# Patient Record
Sex: FEMALE | Race: WHITE | NOT HISPANIC OR LATINO | Employment: UNEMPLOYED | URBAN - METROPOLITAN AREA
[De-identification: names, ages, dates, MRNs, and addresses within clinical notes are randomized per-mention and may not be internally consistent; named-entity substitution may affect disease eponyms.]

---

## 2017-11-01 RX ORDER — ALBUTEROL SULFATE 90 UG/1
2 AEROSOL, METERED RESPIRATORY (INHALATION) EVERY 6 HOURS PRN
COMMUNITY
End: 2021-12-13 | Stop reason: SDUPTHER

## 2017-11-01 NOTE — PRE-PROCEDURE INSTRUCTIONS
Pre-Surgery Instructions:   Medication Instructions    albuterol (PROVENTIL HFA,VENTOLIN HFA) 90 mcg/act inhaler Instructed patient per Anesthesia Guidelines   Fexofenadine-Pseudoephedrine (ALLEGRA-D PO) Instructed patient per Anesthesia Guidelines  Spoke to patient via telephone  Medications reviewed and patient was instructed as per anesthesia guidelines  Patient instructed to avoid all NSAIDs, supplements, vitamins, and Aspirin 7 days prior to surgery  [facility] pre-op instructions reviewed  Pre-op bathing reviewed and patient to  chlorhexidine soap or use a dial antibacterial soap

## 2017-11-09 ENCOUNTER — ANESTHESIA EVENT (OUTPATIENT)
Dept: PERIOP | Facility: HOSPITAL | Age: 42
End: 2017-11-09
Payer: COMMERCIAL

## 2017-11-10 ENCOUNTER — ANESTHESIA (OUTPATIENT)
Dept: PERIOP | Facility: HOSPITAL | Age: 42
End: 2017-11-10
Payer: COMMERCIAL

## 2017-11-10 ENCOUNTER — APPOINTMENT (OUTPATIENT)
Dept: RADIOLOGY | Facility: HOSPITAL | Age: 42
End: 2017-11-10
Payer: COMMERCIAL

## 2017-11-10 ENCOUNTER — HOSPITAL ENCOUNTER (OUTPATIENT)
Facility: HOSPITAL | Age: 42
Setting detail: OUTPATIENT SURGERY
Discharge: HOME | End: 2017-11-10
Attending: PODIATRIST | Admitting: PODIATRIST
Payer: COMMERCIAL

## 2017-11-10 VITALS
WEIGHT: 150 LBS | BODY MASS INDEX: 24.99 KG/M2 | RESPIRATION RATE: 16 BRPM | TEMPERATURE: 97.9 F | SYSTOLIC BLOOD PRESSURE: 99 MMHG | OXYGEN SATURATION: 100 % | DIASTOLIC BLOOD PRESSURE: 62 MMHG | HEART RATE: 58 BPM | HEIGHT: 65 IN

## 2017-11-10 DIAGNOSIS — D21.21 BENIGN NEOPLASM OF CONNECTIVE AND OTHER SOFT TISSUE OF RIGHT LOWER LIMB, INCLUDING HIP: ICD-10-CM

## 2017-11-10 LAB
EXT PREGNANCY TEST URINE: NEGATIVE
HIV 1+2 AB+HIV1 P24 AG SERPL QL IA: NORMAL
HIV1 P24 AG SER QL: NORMAL

## 2017-11-10 PROCEDURE — 88307 TISSUE EXAM BY PATHOLOGIST: CPT | Performed by: PODIATRIST

## 2017-11-10 PROCEDURE — 73630 X-RAY EXAM OF FOOT: CPT

## 2017-11-10 PROCEDURE — 88331 PATH CONSLTJ SURG 1 BLK 1SPC: CPT | Performed by: PATHOLOGY

## 2017-11-10 PROCEDURE — 81025 URINE PREGNANCY TEST: CPT | Performed by: ANESTHESIOLOGY

## 2017-11-10 PROCEDURE — 86803 HEPATITIS C AB TEST: CPT | Performed by: PODIATRIST

## 2017-11-10 PROCEDURE — 88311 DECALCIFY TISSUE: CPT | Performed by: PODIATRIST

## 2017-11-10 PROCEDURE — 87340 HEPATITIS B SURFACE AG IA: CPT | Performed by: PODIATRIST

## 2017-11-10 PROCEDURE — 87806 HIV AG W/HIV1&2 ANTB W/OPTIC: CPT | Performed by: PODIATRIST

## 2017-11-10 RX ORDER — ONDANSETRON 2 MG/ML
INJECTION INTRAMUSCULAR; INTRAVENOUS AS NEEDED
Status: DISCONTINUED | OUTPATIENT
Start: 2017-11-10 | End: 2017-11-10 | Stop reason: SURG

## 2017-11-10 RX ORDER — HYDROCODONE BITARTRATE AND ACETAMINOPHEN 5; 325 MG/1; MG/1
1 TABLET ORAL EVERY 6 HOURS PRN
Qty: 15 TABLET | Refills: 0 | Status: SHIPPED | OUTPATIENT
Start: 2017-11-10 | End: 2017-11-20

## 2017-11-10 RX ORDER — SODIUM CHLORIDE 9 MG/ML
125 INJECTION, SOLUTION INTRAVENOUS CONTINUOUS
Status: DISCONTINUED | OUTPATIENT
Start: 2017-11-10 | End: 2017-11-10 | Stop reason: HOSPADM

## 2017-11-10 RX ORDER — GLYCOPYRROLATE 0.2 MG/ML
INJECTION INTRAMUSCULAR; INTRAVENOUS AS NEEDED
Status: DISCONTINUED | OUTPATIENT
Start: 2017-11-10 | End: 2017-11-10 | Stop reason: SURG

## 2017-11-10 RX ORDER — FENTANYL CITRATE/PF 50 MCG/ML
25 SYRINGE (ML) INJECTION
Status: DISCONTINUED | OUTPATIENT
Start: 2017-11-10 | End: 2017-11-10 | Stop reason: HOSPADM

## 2017-11-10 RX ORDER — PROPOFOL 10 MG/ML
INJECTION, EMULSION INTRAVENOUS AS NEEDED
Status: DISCONTINUED | OUTPATIENT
Start: 2017-11-10 | End: 2017-11-10 | Stop reason: SURG

## 2017-11-10 RX ORDER — MIDAZOLAM HYDROCHLORIDE 1 MG/ML
INJECTION INTRAMUSCULAR; INTRAVENOUS AS NEEDED
Status: DISCONTINUED | OUTPATIENT
Start: 2017-11-10 | End: 2017-11-10 | Stop reason: SURG

## 2017-11-10 RX ORDER — FENTANYL CITRATE 50 UG/ML
INJECTION, SOLUTION INTRAMUSCULAR; INTRAVENOUS AS NEEDED
Status: DISCONTINUED | OUTPATIENT
Start: 2017-11-10 | End: 2017-11-10 | Stop reason: SURG

## 2017-11-10 RX ORDER — ONDANSETRON 2 MG/ML
4 INJECTION INTRAMUSCULAR; INTRAVENOUS ONCE AS NEEDED
Status: DISCONTINUED | OUTPATIENT
Start: 2017-11-10 | End: 2017-11-10 | Stop reason: HOSPADM

## 2017-11-10 RX ADMIN — PROPOFOL 200 MG: 10 INJECTION, EMULSION INTRAVENOUS at 14:53

## 2017-11-10 RX ADMIN — FENTANYL CITRATE 50 MCG: 50 INJECTION INTRAMUSCULAR; INTRAVENOUS at 15:31

## 2017-11-10 RX ADMIN — SODIUM CHLORIDE 125 ML/HR: 0.9 INJECTION, SOLUTION INTRAVENOUS at 16:25

## 2017-11-10 RX ADMIN — MIDAZOLAM HYDROCHLORIDE 2 MG: 1 INJECTION, SOLUTION INTRAMUSCULAR; INTRAVENOUS at 14:45

## 2017-11-10 RX ADMIN — DEXAMETHASONE SODIUM PHOSPHATE 4 MG: 10 INJECTION INTRAMUSCULAR; INTRAVENOUS at 14:57

## 2017-11-10 RX ADMIN — GLYCOPYRROLATE 0.2 MG: 0.2 INJECTION, SOLUTION INTRAMUSCULAR; INTRAVENOUS at 15:07

## 2017-11-10 RX ADMIN — FENTANYL CITRATE 50 MCG: 50 INJECTION INTRAMUSCULAR; INTRAVENOUS at 14:53

## 2017-11-10 RX ADMIN — SODIUM CHLORIDE: 0.9 INJECTION, SOLUTION INTRAVENOUS at 15:01

## 2017-11-10 RX ADMIN — ONDANSETRON HYDROCHLORIDE 4 MG: 2 INJECTION, SOLUTION INTRAVENOUS at 14:57

## 2017-11-10 NOTE — DISCHARGE INSTRUCTIONS
[facility]  Dr El Hathaway  1)  Cold Pack: Apply a cold pack for 45 - Minutes intervals just above the surgical site for the initial 24-48 hours  Never place on the toes  If a cast has been applied  Apply the cold pack to the thigh or knee area  2)  Elevation and Rest: Keep the foot elevated at least as high as the hips for the first 24-48 hours  It would be beneficial for the foot to be elevated when you are sitting during the first 7-10 days  Elevating the foot above the heart level will help control post operative pain and swelling  3)  Medication: Take prescription as prescribed by your physician  If you have any difficulty or side effects with the medication, stop taking immediately and notify your physician at once  Medications given today:     Norco     3a  if applicable you may be given one of the following for prevention of blood clots  O none    Protection of Surgical Site/ Assistive Devices:  a) You will be given one of the following:  O crutches with surgical shoe, weight bearing as tolerated   4) DO NOT GET THE BADAGE WET UNTIL THE DOCTOR GIVES PERMISSION  Keep the bandage clean, dry and intact until your follow-up appointment  5) If you had a peripheral nerve block performed by anesthesia department the numbness and loss of motor function of the extremity can last anywhere from 12-24 hours and sometimes a little bit longer    General Information  1)  Bleeding: some bleeding through the bandage is normal  If bleeding persists, you may attempt to reinforce the existing bandage while following the above instruction  If bleeding persists, notify the physician  2)  Temperature: if your temperature rises [id number] 5 degree, call the office [phone]  3)  Problem: If you notice increasing swelling and/ or pain 2 to 3 days following surgery , please notify    If a splint has been applied and you experience pain to extent the medication prescribed for pain does not seem to be effective you should remove the ace wraps and remove the splint if applied and allow about one half hour for relief then reapply splint and ace wraps  Should pain persist then please notify your physician  4)  Redressing: call the office the day following surgery to schedule a redressing to be in 7 days  Pain: Within the first 24 hours following surgery, if your pain is not controlled sufficiently with pain medication, please check that your bandage is not too tight  You may loosen the badge without removing it  Wait 30 minutes, if your pain is not relieved     Please call the office [phone]

## 2017-11-10 NOTE — DISCHARGE SUMMARY
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, including hip [D21 21]    Discharge Diagnosis: same    Procedures Performed: OPEN EXPLORATION THIRD TOE POSSIBLE REMOVAL OF SOFT TISSUE LESION  FROZEN SECTION: right foot     Complications: none    Condition at Discharge: stable    Discharge instructions/Information to patient and family:   See after visit summary for information provided to patient and family  Provisions for Follow-Up Care/Important appointments:  See after visit summary for information related to follow-up care and any pertinent home health orders  Discharge Medications:  See after visit summary for reconciled discharge medications provided to patient and family

## 2017-11-10 NOTE — ANESTHESIA PREPROCEDURE EVALUATION
Review of Systems/Medical History  Patient summary reviewed  Chart reviewed      Cardiovascular  Exercise tolerance: good,     Pulmonary  Asthma: well controlled/ stable , ,        GI/Hepatic  Negative GI/hepatic ROS          Negative  ROS        Endo/Other  Negative endo/other ROS      GYN  Negative gynecology ROS          Hematology  Anemia ,     Musculoskeletal  Negative musculoskeletal ROS        Neurology  Negative neurology ROS      Psychology   Negative psychology ROS            Physical Exam    Airway    Mallampati score: II  TM Distance: <3 FB  Neck ROM: full     Dental       Cardiovascular  Rhythm: regular, Rate: normal,     Pulmonary  Pulmonary exam normal     Other Findings        Anesthesia Plan  ASA Score- 2       Anesthesia Type- general with ASA Monitors  Additional Monitors:   Airway Plan:           Induction- intravenous  Informed Consent- Anesthetic plan and risks discussed with patient

## 2017-11-10 NOTE — OP NOTE
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, including hip [D21 21]    Procedures:   Right 3rd digit exploration and soft tissue removal   Right 3rd digit middle phalanx resection     Specimen(s):  ID Type Source Tests Collected by Time Destination   1 : third right toe crystal anlaysis Tissue Toe, Right TISSUE EXAM Sander Michel DPM 11/10/2017 1512    2 : bone lesion from base of middle phalanax third toe Tissue Toe, Right TISSUE EXAM Sander Michel DPM 11/10/2017 1520        Estimated Blood Loss:   Minimal    Drains: none     Hemostasis:  Pneumatic ankle tourniquet at 250 mm of mercury for 27 minutes    Materials:  3 0 Vicryl and 4 0 Prolene     Anesthesia Type:   IV Sedation with Anesthesia    Operative Indications:  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, including hip [D21 21]    Operative Findings:  Frozen section results showed calcium pyrophosphate  Possible bone mass was noted to the middle phalanx base which was excised and sent permanent pathology  Will await final results  Complications:   None    Procedure and Technique:  Under mild sedation, the patient was brought into the operating room and placed on the operating room table in the supine position  A pneumatic ankle tourniquet was then placed around the patient's right ankle with ample webril padding  A time out was performed to confirm the correct patient, procedure and site with all parties in agreement   Following IV sedation, local anesthetic was obtained about the patient's right foot and injection was performed consisting of 2 ml of 1% Lidocaine and 0 5% Bupivacaine in a 1:1 mixture  The foot was then scrubbed, prepped and draped in the usual aseptic manner  An esmarch bandage was utilized to exsangunate the patients foot and the pneumatic ankle tourniquet was then inflated  The esmarch bandage was removed and the foot was placed on the operating room table  Attention was then directed to the lateral aspect of the right foot 3rd digit where an incision was made ~2cm long  Then incision was deepened via blunt and sharp dissection  There was soft tissue mass which appeared to be white with some white serous drainage which was excised and sent to frozen section  Frozen section results were notified from pathology department which was calcium pyrophosphate  Then the proximal interphalangeal joint was explored where a possible bone mass was noted to the base of the middle phalanx, this was excised with the use of osteotome and sent to permanent pathology  Surgical site was inspected for any further abnormality and none were noted  Then it was irrigated with copious amounts of sterile saline  The capsule was reapproximated and closed with 3 0 Vicryl  Skin was reapproximated and closed with 4 0 Prolene in horizontal mattress fashion  Surgical site was dressed with Xeroform 4 x 4 gauze Raul and Ace  Tourniquet was deflated at this time a normal hyperemic response was noted to the digits  Patient tolerated the procedure and anesthesia well and was transported to the PACU with vital signs stable      Patient Disposition:  PACU  and hemodynamically stable    SIGNATURE: Liset Hansen DPM  DATE: November 10, 2017  TIME: 3:44 PM

## 2017-11-10 NOTE — ANESTHESIA POSTPROCEDURE EVALUATION
Post-Op Assessment Note      CV Status:  Stable    Mental Status:  Alert and awake    Hydration Status:  Euvolemic    PONV Controlled:  Controlled    Airway Patency:  Patent    Post Op Vitals Reviewed: Yes          Staff: Anesthesiologist           BP 99/62 (11/10/17 1715)    Temp      Pulse 58 (11/10/17 1715)   Resp 16 (11/10/17 1715)    SpO2 100 % (11/10/17 1715)

## 2017-11-11 LAB — HBV SURFACE AG SER QL: NORMAL

## 2017-11-12 LAB — HCV AB SER QL: NORMAL

## 2019-12-03 ENCOUNTER — OFFICE VISIT (OUTPATIENT)
Dept: FAMILY MEDICINE CLINIC | Facility: CLINIC | Age: 44
End: 2019-12-03
Payer: COMMERCIAL

## 2019-12-03 VITALS
HEART RATE: 77 BPM | RESPIRATION RATE: 20 BRPM | BODY MASS INDEX: 25.78 KG/M2 | TEMPERATURE: 99 F | HEIGHT: 64 IN | DIASTOLIC BLOOD PRESSURE: 70 MMHG | WEIGHT: 151 LBS | OXYGEN SATURATION: 99 % | SYSTOLIC BLOOD PRESSURE: 108 MMHG

## 2019-12-03 DIAGNOSIS — M25.551 PAIN OF RIGHT HIP JOINT: ICD-10-CM

## 2019-12-03 DIAGNOSIS — E66.3 OVERWEIGHT (BMI 25.0-29.9): ICD-10-CM

## 2019-12-03 DIAGNOSIS — Z00.00 ANNUAL PHYSICAL EXAM: Primary | ICD-10-CM

## 2019-12-03 DIAGNOSIS — Z13.220 SCREENING FOR CHOLESTEROL LEVEL: ICD-10-CM

## 2019-12-03 PROCEDURE — 99386 PREV VISIT NEW AGE 40-64: CPT | Performed by: NURSE PRACTITIONER

## 2019-12-03 NOTE — PROGRESS NOTES
[address] [facility] Care        NAME: Bushra Lacey is a 38 y o  female  : 1975    MRN: [id number]  DATE: December 3, 2019  TIME: 8:15 AM    Assessment and Plan   Annual physical exam [Z00 00]  1  Annual physical exam  Comprehensive metabolic panel    Lipid panel    Vitamin D 25 hydroxy   2  Pain of right hip joint  Vitamin D 25 hydroxy    Lyme Antibody Profile with reflex to WB   3  Screening for cholesterol level  Lipid panel   4  Overweight (BMI 25 0-29  9)           Patient Instructions     Patient Instructions   Get lab work done  Call or make appointment if symptoms worsening or develop rash  Call if physical therapy referral is needed           Chief Complaint     Chief Complaint   Patient presents with    Establish Care         History of Present Illness       Presents to office to establish care previous seen by [facility] once  Uses albuterol inhaler as needed for asthma, does not need refill at this time  Takes Allegra-D  Complains of pain in right leg during the night that would wake her up for the pass month and a half, now has a consistent pain to right hip and lateral thigh area and right lateral calf  Describe pain as aching  Patient would like Vitamin D level check  Discussed checking Lyme titer   Discussed possibility of lumbar spine issuse causing pain to right leg        Review of Systems   Review of Systems   Constitutional: Positive for fatigue  Negative for activity change, diaphoresis and fever  HENT: Negative for congestion, ear pain, facial swelling, hearing loss, rhinorrhea, sinus pressure, sinus pain, sneezing, sore throat and voice change  Eyes: Negative for discharge and visual disturbance  Respiratory: Negative for cough, choking, chest tightness, shortness of breath, wheezing and stridor  Cardiovascular: Negative for chest pain, palpitations and leg swelling     Gastrointestinal: Negative for abdominal distention, abdominal pain, constipation, diarrhea, nausea and vomiting  Endocrine: Negative for polydipsia, polyphagia and polyuria  Genitourinary: Negative for difficulty urinating, dysuria, frequency and urgency  Musculoskeletal: Positive for joint swelling  Negative for arthralgias, back pain, gait problem (pain to right hip ), myalgias, neck pain and neck stiffness  Skin: Negative for color change, rash and wound  Neurological: Negative for dizziness, syncope, speech difficulty, weakness, light-headedness and headaches  Hematological: Negative for adenopathy  Does not bruise/bleed easily  Psychiatric/Behavioral: Negative for agitation, behavioral problems, confusion, hallucinations, sleep disturbance and suicidal ideas  The patient is not nervous/anxious  Current Medications       Current Outpatient Medications:     albuterol (PROVENTIL HFA,VENTOLIN HFA) 90 mcg/act inhaler, Inhale 2 puffs every 6 (six) hours as needed for wheezing, Disp: , Rfl:     Fexofenadine-Pseudoephedrine (ALLEGRA-D 24 HOUR PO), Take by mouth, Disp: , Rfl:     Current Allergies     Allergies as of 12/03/2019    (No Known Allergies)            The following portions of the patient's history were reviewed and updated as appropriate: allergies, current medications, past family history, past medical history, past social history, past surgical history and problem list      Past Medical History:   Diagnosis Date    Acute closed-angle glaucoma     Anemia     Asthma        Past Surgical History:   Procedure Laterality Date    EYE SURGERY      MASS EXCISION Right 11/10/2017    Procedure: OPEN EXPLORATION THIRD TOE POSSIBLE REMOVAL OF SOFT TISSUE [address] [facility] TOE;  Surgeon: Kimberly Coto DPM;  Location: [facility];  Service: Podiatry    WISDOM TOOTH EXTRACTION         Family History   Problem Relation Age of Onset    Cancer Father          Medications have been verified          Objective   /70 Pulse 77   Temp 99 °F (37 2 °C) (Tympanic)   Resp 20   Ht 5' 4" (1 626 m)   Wt 68 5 kg (151 lb)   SpO2 99%   BMI 25 92 kg/m²        Physical Exam     Physical Exam   Constitutional: She is oriented to person, place, and time  She appears well-developed and well-nourished  No distress  HENT:   Head: Normocephalic  Right Ear: External ear normal    Left Ear: External ear normal    Eyes: Conjunctivae and EOM are normal  Right eye exhibits no discharge  Left eye exhibits no discharge  Neck: Normal range of motion  Neck supple  No tracheal deviation present  No thyromegaly present  Cardiovascular: Normal rate, regular rhythm and normal heart sounds  No murmur heard  Pulmonary/Chest: Effort normal and breath sounds normal  No respiratory distress  She has no wheezes  Musculoskeletal: Normal range of motion  She exhibits no edema or deformity  Neurological: She is alert and oriented to person, place, and time  She displays normal reflexes  Skin: Skin is warm and dry  No rash noted  She is not diaphoretic  No erythema  No pallor  Psychiatric: She has a normal mood and affect  Her behavior is normal  Judgment and thought content normal        BMI Counseling: Body mass index is 25 92 kg/m²  The BMI is above normal  Exercise recommendations include exercising 3-5 times per week        PHQ-9 Depression Screening    PHQ-9:    Frequency of the following problems over the past two weeks:       Little interest or pleasure in doing things:  0 - not at all  Feeling down, depressed, or hopeless:  1 - several days  PHQ-2 Score:  1

## 2019-12-03 NOTE — PATIENT INSTRUCTIONS
Get lab work done  Call or make appointment if symptoms worsening or develop rash  Call if physical therapy referral is needed

## 2019-12-17 ENCOUNTER — APPOINTMENT (OUTPATIENT)
Dept: LAB | Facility: HOSPITAL | Age: 44
End: 2019-12-17
Payer: COMMERCIAL

## 2019-12-17 DIAGNOSIS — M25.551 PAIN OF RIGHT HIP JOINT: ICD-10-CM

## 2019-12-17 DIAGNOSIS — Z00.00 ANNUAL PHYSICAL EXAM: ICD-10-CM

## 2019-12-17 DIAGNOSIS — Z13.220 SCREENING FOR CHOLESTEROL LEVEL: ICD-10-CM

## 2019-12-17 LAB
25(OH)D3 SERPL-MCNC: 27.2 NG/ML (ref 30–100)
ALBUMIN SERPL BCP-MCNC: 3.8 G/DL (ref 3.5–5)
ALP SERPL-CCNC: 40 U/L (ref 46–116)
ALT SERPL W P-5'-P-CCNC: 18 U/L (ref 12–78)
ANION GAP SERPL CALCULATED.3IONS-SCNC: 4 MMOL/L (ref 4–13)
AST SERPL W P-5'-P-CCNC: 17 U/L (ref 5–45)
BILIRUB SERPL-MCNC: 0.58 MG/DL (ref 0.2–1)
BUN SERPL-MCNC: 12 MG/DL (ref 5–25)
CALCIUM SERPL-MCNC: 9.1 MG/DL (ref 8.3–10.1)
CHLORIDE SERPL-SCNC: 106 MMOL/L (ref 100–108)
CHOLEST SERPL-MCNC: 162 MG/DL (ref 50–200)
CO2 SERPL-SCNC: 29 MMOL/L (ref 21–32)
CREAT SERPL-MCNC: 0.71 MG/DL (ref 0.6–1.3)
GFR SERPL CREATININE-BSD FRML MDRD: 103 ML/MIN/1.73SQ M
GLUCOSE P FAST SERPL-MCNC: 81 MG/DL (ref 65–99)
HDLC SERPL-MCNC: 62 MG/DL
LDLC SERPL CALC-MCNC: 92 MG/DL (ref 0–100)
NONHDLC SERPL-MCNC: 100 MG/DL
POTASSIUM SERPL-SCNC: 4 MMOL/L (ref 3.5–5.3)
PROT SERPL-MCNC: 7.4 G/DL (ref 6.4–8.2)
SODIUM SERPL-SCNC: 139 MMOL/L (ref 136–145)
TRIGL SERPL-MCNC: 38 MG/DL

## 2019-12-17 PROCEDURE — 80053 COMPREHEN METABOLIC PANEL: CPT

## 2019-12-17 PROCEDURE — 82306 VITAMIN D 25 HYDROXY: CPT

## 2019-12-17 PROCEDURE — 80061 LIPID PANEL: CPT

## 2019-12-17 PROCEDURE — 86618 LYME DISEASE ANTIBODY: CPT

## 2019-12-17 PROCEDURE — 36415 COLL VENOUS BLD VENIPUNCTURE: CPT

## 2019-12-18 LAB — B BURGDOR IGG+IGM SER-ACNC: <0.91 ISR (ref 0–0.9)

## 2020-06-05 DIAGNOSIS — B37.3 VAGINAL YEAST INFECTION: Primary | ICD-10-CM

## 2020-06-05 RX ORDER — FLUCONAZOLE 150 MG/1
150 TABLET ORAL DAILY
Qty: 3 TABLET | Refills: 0 | Status: SHIPPED | OUTPATIENT
Start: 2020-06-05 | End: 2020-06-08

## 2020-08-13 ENCOUNTER — OFFICE VISIT (OUTPATIENT)
Dept: FAMILY MEDICINE CLINIC | Facility: CLINIC | Age: 45
End: 2020-08-13
Payer: COMMERCIAL

## 2020-08-13 VITALS
RESPIRATION RATE: 18 BRPM | HEIGHT: 64 IN | WEIGHT: 148 LBS | BODY MASS INDEX: 25.27 KG/M2 | TEMPERATURE: 97.7 F | OXYGEN SATURATION: 98 % | DIASTOLIC BLOOD PRESSURE: 66 MMHG | HEART RATE: 70 BPM | SYSTOLIC BLOOD PRESSURE: 112 MMHG

## 2020-08-13 DIAGNOSIS — E66.3 OVERWEIGHT WITH BODY MASS INDEX (BMI) OF 25 TO 25.9 IN ADULT: ICD-10-CM

## 2020-08-13 DIAGNOSIS — M25.561 ACUTE PAIN OF RIGHT KNEE: ICD-10-CM

## 2020-08-13 DIAGNOSIS — F41.9 ANXIETY: ICD-10-CM

## 2020-08-13 DIAGNOSIS — M25.551 PAIN OF RIGHT HIP JOINT: Primary | ICD-10-CM

## 2020-08-13 PROCEDURE — 1036F TOBACCO NON-USER: CPT | Performed by: NURSE PRACTITIONER

## 2020-08-13 PROCEDURE — 99213 OFFICE O/P EST LOW 20 MIN: CPT | Performed by: NURSE PRACTITIONER

## 2020-08-13 PROCEDURE — 3725F SCREEN DEPRESSION PERFORMED: CPT | Performed by: NURSE PRACTITIONER

## 2020-08-13 PROCEDURE — 3008F BODY MASS INDEX DOCD: CPT | Performed by: NURSE PRACTITIONER

## 2020-08-13 RX ORDER — ALPRAZOLAM 0.5 MG/1
0.5 TABLET ORAL 2 TIMES DAILY PRN
Qty: 30 TABLET | Refills: 1 | Status: SHIPPED | OUTPATIENT
Start: 2020-08-13 | End: 2021-05-07 | Stop reason: SDUPTHER

## 2020-08-13 RX ORDER — PREDNISONE 20 MG/1
TABLET ORAL
Qty: 15 TABLET | Refills: 0 | Status: SHIPPED | OUTPATIENT
Start: 2020-08-13 | End: 2021-12-13

## 2020-08-13 NOTE — PATIENT INSTRUCTIONS
Xanax- as discussed/directed  Referral given for physical therapy and consult sports medicine  Prednisone as directed   Call for any problems/concerns

## 2020-08-13 NOTE — PROGRESS NOTES
[address] [place] [address] Primary Care        NAME: Gabriella Irving is a 54 y o  female  : 1975    MRN: [id number]  DATE: 2020  TIME: 12:26 PM    Assessment and Plan   Pain of right hip joint [M25 551]  1  Pain of right hip joint  Ambulatory referral to Physical Therapy    predniSONE 20 mg tablet    Ambulatory referral to Sports Medicine   2  Acute pain of right knee  Ambulatory referral to Sports Medicine   3  Anxiety  ALPRAZolam (XANAX) 0 5 mg tablet   4  Overweight with body mass index (BMI) of 25 to 25 9 in adult           Patient Instructions     Patient Instructions   Xanax- as discussed/directed  Referral given for physical therapy and consult sports medicine  Prednisone as directed   Call for any problems/concerns          Chief Complaint     Chief Complaint   Patient presents with    Back Pain    Hip Pain     located right side     Leg Pain     located right side     would like to start xanax     pt would like to start xanax was on it in the past prescibed by her gyn she only takes it during her menstrual cycles due to having these out bursts such as crying and anger          History of Present Illness       1  C/o right hip and back pain x about 1 year- waking up in the night, can't sit for long periods of time, "I was functioning but then Covid started and I was raking incessantly and then I couldn't walk for 4 days"  Pain was different every day-   Walks 4 miles every morning, even when in severe pain-   Was getting anxious to fall asleep knowing she would get up in 1 hour in pain  Did stretching exercises through the entire pain  Right hip and right knee are now the worst- in the last 2 weeks c/o low back pain- worse when she had her period but since last period low back pain did not improve    Pain does not wake her up anymore in the middle of the night in severe pain x 3 weeks- if she does wake up the pain is dull  Denies tingling/numbness in either leg  Has not tried muscle relaxers  Will sometimes take Advil and Melatonin before bed- has not taken before bed in the last 3 weeks  Review of Systems   Review of Systems   Constitutional: Negative for activity change, diaphoresis, fatigue and fever  HENT: Negative for congestion, facial swelling, hearing loss, rhinorrhea, sinus pressure, sinus pain, sneezing, sore throat and voice change  Eyes: Negative for discharge and visual disturbance  Respiratory: Negative for cough, choking, chest tightness, shortness of breath, wheezing and stridor  Cardiovascular: Negative for chest pain, palpitations and leg swelling  Gastrointestinal: Negative for abdominal distention, abdominal pain, constipation, diarrhea, nausea and vomiting  Endocrine: Negative for polydipsia, polyphagia and polyuria  Genitourinary: Negative for difficulty urinating, dysuria, frequency and urgency  Musculoskeletal: Positive for arthralgias and back pain  Negative for gait problem, joint swelling, myalgias, neck pain and neck stiffness  Skin: Negative for color change, rash and wound  Neurological: Negative for dizziness, syncope, speech difficulty, weakness, light-headedness and headaches  Hematological: Negative for adenopathy  Does not bruise/bleed easily  Psychiatric/Behavioral: Positive for sleep disturbance  Negative for agitation, behavioral problems, confusion, hallucinations and suicidal ideas  The patient is nervous/anxious (3 days a month when she has menstrual period)            Current Medications       Current Outpatient Medications:     albuterol (PROVENTIL HFA,VENTOLIN HFA) 90 mcg/act inhaler, Inhale 2 puffs every 6 (six) hours as needed for wheezing, Disp: , Rfl:     Fexofenadine-Pseudoephedrine (ALLEGRA-D 24 HOUR PO), Take by mouth, Disp: , Rfl:     ALPRAZolam (XANAX) 0 5 mg tablet, Take 1 tablet (0 5 mg total) by mouth 2 (two) times a day as needed for anxiety, Disp: 30 tablet, Rfl: 1    predniSONE 20 mg tablet, 20mg BID x 5 days, then 20mg daily x 5 days, Disp: 15 tablet, Rfl: 0    Current Allergies     Allergies as of 08/13/2020    (No Known Allergies)            The following portions of the patient's history were reviewed and updated as appropriate: allergies, current medications, past family history, past medical history, past social history, past surgical history and problem list      Past Medical History:   Diagnosis Date    Acute closed-angle glaucoma     Anemia     Asthma        Past Surgical History:   Procedure Laterality Date    EYE SURGERY      MASS EXCISION Right 11/10/2017    Procedure: OPEN EXPLORATION THIRD TOE POSSIBLE REMOVAL OF SOFT TISSUE [address] [facility] TOE;  Surgeon: Jayesh Mayfield DPM;  Location: [facility];  Service: Podiatry    WISDOM TOOTH EXTRACTION         Family History   Problem Relation Age of Onset    Cancer Father          Medications have been verified  Objective   /66   Pulse 70   Temp 97 7 °F (36 5 °C)   Resp 18   Ht 5' 4" (1 626 m)   Wt 67 1 kg (148 lb)   SpO2 98%   BMI 25 40 kg/m²        Physical Exam     Physical Exam  Vitals signs and nursing note reviewed  Constitutional:       General: She is not in acute distress  Appearance: She is well-developed  She is not diaphoretic  Neck:      Musculoskeletal: Normal range of motion and neck supple  Thyroid: No thyromegaly  Trachea: No tracheal deviation  Cardiovascular:      Rate and Rhythm: Normal rate and regular rhythm  Heart sounds: Normal heart sounds  No murmur  Pulmonary:      Effort: Pulmonary effort is normal  No respiratory distress  Breath sounds: Normal breath sounds  No wheezing  Musculoskeletal: Normal range of motion  General: No deformity  Right hip: She exhibits tenderness  She exhibits normal range of motion, normal strength, no bony tenderness, no swelling, no crepitus and no deformity        Lumbar back: Normal    Skin:     General: Skin is warm and dry  Findings: No erythema or rash  Neurological:      Mental Status: She is alert and oriented to person, place, and time  Psychiatric:         Behavior: Behavior normal  Behavior is cooperative  Thought Content: Thought content normal          Judgment: Judgment normal          BMI Counseling: Body mass index is 25 4 kg/m²  The BMI is above normal  Exercise recommendations include exercising 3-5 times per week

## 2020-08-17 ENCOUNTER — OFFICE VISIT (OUTPATIENT)
Dept: OBGYN CLINIC | Facility: CLINIC | Age: 45
End: 2020-08-17
Payer: COMMERCIAL

## 2020-08-17 ENCOUNTER — APPOINTMENT (OUTPATIENT)
Dept: RADIOLOGY | Facility: CLINIC | Age: 45
End: 2020-08-17
Payer: COMMERCIAL

## 2020-08-17 VITALS
TEMPERATURE: 98.9 F | DIASTOLIC BLOOD PRESSURE: 83 MMHG | HEART RATE: 68 BPM | BODY MASS INDEX: 25.3 KG/M2 | SYSTOLIC BLOOD PRESSURE: 126 MMHG | HEIGHT: 64 IN | WEIGHT: 148.2 LBS

## 2020-08-17 DIAGNOSIS — M54.16 RADICULOPATHY, LUMBAR REGION: Primary | ICD-10-CM

## 2020-08-17 DIAGNOSIS — M54.16 RADICULOPATHY, LUMBAR REGION: ICD-10-CM

## 2020-08-17 DIAGNOSIS — M25.561 ACUTE PAIN OF RIGHT KNEE: ICD-10-CM

## 2020-08-17 PROCEDURE — 99243 OFF/OP CNSLTJ NEW/EST LOW 30: CPT | Performed by: EMERGENCY MEDICINE

## 2020-08-17 PROCEDURE — 1036F TOBACCO NON-USER: CPT | Performed by: EMERGENCY MEDICINE

## 2020-08-17 PROCEDURE — 3008F BODY MASS INDEX DOCD: CPT | Performed by: EMERGENCY MEDICINE

## 2020-08-17 PROCEDURE — 72100 X-RAY EXAM L-S SPINE 2/3 VWS: CPT

## 2020-08-17 NOTE — LETTER
August 17, 2020     Natalie Barrientos, [address] [facility]    Patient: Claudette Pelton   YOB: 1975   Date of Visit: 8/17/2020       Dear Dr Bal Burr: Thank you for referring Claudette Pelton to me for evaluation  Below are my notes for this consultation  If you have questions, please do not hesitate to call me  I look forward to following your patient along with you  Sincerely,        Cheng James MD        CC: No Recipients  Cheng James MD  8/17/2020 10:59 AM  Incomplete      Assessment/Plan:    Diagnoses and all orders for this visit:    Radiculopathy, lumbar region  -     Ambulatory referral to Physical Therapy; Future  -     XR spine lumbar 2 or 3 views injury; Future    Acute pain of right knee  -     Ambulatory referral to Sports Medicine        No follow-ups on file  Chief Complaint:     Chief Complaint   Patient presents with    Right Hip - Pain       Subjective:   Patient ID: Claudette Pelton is a 54 y o  female  NP presents referred by PCP for diffuse migrating right leg pain since this past fall, denies injury  Pain seems to cause trouble sleeping and worse with resting, improved with activity  Pain is an ache, denies n/t/weakness or changes b/b  She notes "bad back pain" during menstruation  She has been taking advil prn to help sleep, was evaluted by PCP and placed on prednisone, she's noted improved sleep  Denies unintentional weight loss or night sweats  Review of Systems   Constitutional: Negative for fever  Respiratory: Negative for shortness of breath  Cardiovascular: Negative for chest pain  Gastrointestinal: Negative for abdominal pain  Genitourinary: Negative for difficulty urinating  Musculoskeletal: Positive for back pain  Skin: Negative for rash  Neurological: Negative for weakness  Psychiatric/Behavioral: Negative for suicidal ideas         The following portions of the patient's chart were reviewed and updated as appropriate:    Allergy:  No Known Allergies      Past Medical History:   Diagnosis Date    Acute closed-angle glaucoma     Anemia     Asthma        Past Surgical History:   Procedure Laterality Date    EYE SURGERY      MASS EXCISION Right 11/10/2017    Procedure: OPEN EXPLORATION THIRD TOE POSSIBLE REMOVAL OF SOFT TISSUE LESION  FROZEN SECTION,INCISIONAL BONE BIOPSY BASE MIDDLE PHALANAX THIRD TOE;  Surgeon: Kisha Junior DPM;  Location: [facility];  Service: Podiatry    WISDOM TOOTH EXTRACTION         Social History     Socioeconomic History    Marital status: /Civil Union     Spouse name: Not on file    Number of children: Not on file    Years of education: Not on file    Highest education level: Not on file   Occupational History    Not on file   Social Needs    Financial resource strain: Not on file    Food insecurity     Worry: Not on file     Inability: Not on file    Transportation needs     Medical: Not on file     Non-medical: Not on file   Tobacco Use    Smoking status: Former Smoker    Smokeless tobacco: Never Used    Tobacco comment: quit 20 years ago   Substance and Sexual Activity    Alcohol use: Yes     Frequency: 2-4 times a month     Comment: about 8 to 10 oz weekly    Drug use: No    Sexual activity: Not on file   Lifestyle    Physical activity     Days per week: Not on file     Minutes per session: Not on file    Stress: Not on file   Relationships    Social connections     Talks on phone: Not on file     Gets together: Not on file     Attends Shinto service: Not on file     Active member of club or organization: Not on file     Attends meetings of clubs or organizations: Not on file     Relationship status: Not on file    Intimate partner violence     Fear of current or ex partner: Not on file     Emotionally abused: Not on file     Physically abused: Not on file     Forced sexual activity: Not on file   Other Topics Concern    Not on file   Social History Narrative    Not on file       Family History   Problem Relation Age of Onset    Cancer Father        Medications:    Current Outpatient Medications:     albuterol (PROVENTIL HFA,VENTOLIN HFA) 90 mcg/act inhaler, Inhale 2 puffs every 6 (six) hours as needed for wheezing, Disp: , Rfl:     ALPRAZolam (XANAX) 0 5 mg tablet, Take 1 tablet (0 5 mg total) by mouth 2 (two) times a day as needed for anxiety, Disp: 30 tablet, Rfl: 1    Fexofenadine-Pseudoephedrine (ALLEGRA-D 24 HOUR PO), Take by mouth, Disp: , Rfl:     predniSONE 20 mg tablet, 20mg BID x 5 days, then 20mg daily x 5 days, Disp: 15 tablet, Rfl: 0    There is no problem list on file for this patient  Objective:  /83 (BP Location: Left arm, Patient Position: Sitting, Cuff Size: Standard)   Pulse 68   Temp 98 9 °F (37 2 °C)   Ht 5' 4" (1 626 m)   Wt 67 2 kg (148 lb 3 2 oz)   BMI 25 44 kg/m²     Back Exam     Range of Motion   The patient has normal back ROM  Muscle Strength   The patient has normal back strength  Tests   Straight leg raise right: negative    Reflexes   Patellar: normal    Other   Toe walk: normal  Heel walk: normal  Sensation: normal  Gait: normal   Erythema: no back redness            Physical Exam  Vitals signs reviewed  Constitutional:       Appearance: She is well-developed  HENT:      Head: Normocephalic and atraumatic  Eyes:      Conjunctiva/sclera: Conjunctivae normal    Neck:      Musculoskeletal: Normal range of motion and neck supple  Cardiovascular:      Rate and Rhythm: Normal rate  Pulmonary:      Effort: Pulmonary effort is normal  No respiratory distress  Skin:     General: Skin is warm and dry  Neurological:      Mental Status: She is alert and oriented to person, place, and time  Psychiatric:         Behavior: Behavior normal            Neurologic Exam     Mental Status   Oriented to person, place, and time         Procedures    I have personally reviewed the written report of the pertinent studies  Hannah Saucedo MD  8/17/2020 10:55 AM  Sign when Signing Visit      Assessment/Plan:    Diagnoses and all orders for this visit:    Radiculopathy, lumbar region  -     Ambulatory referral to Physical Therapy; Future    Pain of right hip joint  -     Ambulatory referral to Sports Medicine    Acute pain of right knee  -     Ambulatory referral to Sports Medicine        No follow-ups on file  Chief Complaint:     Chief Complaint   Patient presents with    Right Hip - Pain       Subjective:   Patient ID: Umang Singh is a 54 y o  female  NP presents referred by PCP for diffuse migrating right leg pain since this past fall  Pain seems to cause trouble sleeping and resting, improved with activity  Pain is an ache, denies n/t/weakness  She notes "bad back pain" during menstruation  Review of Systems    The following portions of the patient's chart were reviewed and updated as appropriate:    Allergy:  No Known Allergies      Past Medical History:   Diagnosis Date    Acute closed-angle glaucoma     Anemia     Asthma        Past Surgical History:   Procedure Laterality Date    EYE SURGERY      MASS EXCISION Right 11/10/2017    Procedure: OPEN EXPLORATION THIRD TOE POSSIBLE REMOVAL OF SOFT TISSUE LESION  FROZEN SECTION,INCISIONAL BONE BIOPSY BASE MIDDLE PHALANAX THIRD TOE;  Surgeon: Estela Jaramillo DPM;  Location: [facility];  Service: Podiatry    WISDOM TOOTH EXTRACTION         Social History     Socioeconomic History    Marital status: /Civil Union     Spouse name: Not on file    Number of children: Not on file    Years of education: Not on file    Highest education level: Not on file   Occupational History    Not on file   Social Needs    Financial resource strain: Not on file    Food insecurity     Worry: Not on file     Inability: Not on file    Transportation needs     Medical: Not on file     Non-medical: Not on file   Tobacco Use    Smoking status: Former Smoker    Smokeless tobacco: Never Used    Tobacco comment: quit 20 years ago   Substance and Sexual Activity    Alcohol use: Yes     Frequency: 2-4 times a month     Comment: about 8 to 10 oz weekly    Drug use: No    Sexual activity: Not on file   Lifestyle    Physical activity     Days per week: Not on file     Minutes per session: Not on file    Stress: Not on file   Relationships    Social connections     Talks on phone: Not on file     Gets together: Not on file     Attends Yazidi service: Not on file     Active member of club or organization: Not on file     Attends meetings of clubs or organizations: Not on file     Relationship status: Not on file    Intimate partner violence     Fear of current or ex partner: Not on file     Emotionally abused: Not on file     Physically abused: Not on file     Forced sexual activity: Not on file   Other Topics Concern    Not on file   Social History Narrative    Not on file       Family History   Problem Relation Age of Onset    Cancer Father        Medications:    Current Outpatient Medications:     albuterol (PROVENTIL HFA,VENTOLIN HFA) 90 mcg/act inhaler, Inhale 2 puffs every 6 (six) hours as needed for wheezing, Disp: , Rfl:     ALPRAZolam (XANAX) 0 5 mg tablet, Take 1 tablet (0 5 mg total) by mouth 2 (two) times a day as needed for anxiety, Disp: 30 tablet, Rfl: 1    Fexofenadine-Pseudoephedrine (ALLEGRA-D 24 HOUR PO), Take by mouth, Disp: , Rfl:     predniSONE 20 mg tablet, 20mg BID x 5 days, then 20mg daily x 5 days, Disp: 15 tablet, Rfl: 0    There is no problem list on file for this patient  Objective:  /83 (BP Location: Left arm, Patient Position: Sitting, Cuff Size: Standard)   Pulse 68   Temp 98 9 °F (37 2 °C)   Ht 5' 4" (1 626 m)   Wt 67 2 kg (148 lb 3 2 oz)   BMI 25 44 kg/m²     Back Exam     Range of Motion   The patient has normal back ROM      Muscle Strength   The patient has normal back strength  Tests   Straight leg raise right: negative    Reflexes   Patellar: normal    Other   Toe walk: normal  Heel walk: normal  Sensation: normal  Gait: normal   Erythema: no back redness            Physical Exam  Vitals signs reviewed  Constitutional:       Appearance: She is well-developed  HENT:      Head: Normocephalic and atraumatic  Eyes:      Conjunctiva/sclera: Conjunctivae normal    Neck:      Musculoskeletal: Normal range of motion and neck supple  Cardiovascular:      Rate and Rhythm: Normal rate  Pulmonary:      Effort: Pulmonary effort is normal  No respiratory distress  Skin:     General: Skin is warm and dry  Neurological:      Mental Status: She is alert and oriented to person, place, and time  Psychiatric:         Behavior: Behavior normal            Neurologic Exam     Mental Status   Oriented to person, place, and time  Procedures    I have personally reviewed the written report of the pertinent studies

## 2020-08-17 NOTE — PATIENT INSTRUCTIONS
You may use Advil (ibuprofen) 600mg every 6 hours OR Aleve (naproxen) 250-500mg every 12 hours as needed for pain and inflammation  You may also take Tylenol 500mg every 4-6 hours as needed  Check with your primary care physician to see if these medications are safe to take and to make sure they do not interfere with your other medications and medical issues  Lumbar Radiculopathy   WHAT YOU NEED TO KNOW:   Lumbar radiculopathy is a painful condition that happens when a nerve in your lumbar spine (lower back) is pinched or irritated  Nerves control feeling and movement in your body  You may have numbness or pain that shoots down from your lower back towards your foot  DISCHARGE INSTRUCTIONS:   Medicines:   · Medicines:     ¨ NSAIDs , such as ibuprofen, help decrease swelling, pain, and fever  This medicine is available with or without a doctor's order  NSAIDs can cause stomach bleeding or kidney problems in certain people  If you take blood thinner medicine, always ask your healthcare provider if NSAIDs are safe for you  Always read the medicine label and follow directions  ¨ Muscle relaxers  help decrease pain and muscle spasms  ¨ Opioids: This is a strong medicine given to reduce severe pain  It is also called narcotic pain medicine  Take this medicine exactly as directed by your healthcare provider  ¨ Oral steroids: Steroids may also be given to reduce pain and swelling  ¨ Take your medicine as directed  Contact your healthcare provider if you think your medicine is not helping or if you have side effects  Tell him of her if you are allergic to any medicine  Keep a list of the medicines, vitamins, and herbs you take  Include the amounts, and when and why you take them  Bring the list or the pill bottles to follow-up visits  Carry your medicine list with you in case of an emergency      Follow up with your healthcare provider or spine specialist within 1 to 3 weeks:  After your first follow-up appointment, return to your healthcare provider or spine specialist every 2 weeks until you have healed  Ask for information about physical therapy for your condition  Write down your questions so you remember to ask them during your visits  Physical therapy:  You may need physical therapy to improve your condition  Your physical therapist may teach you certain exercises to improve posture (the way you stand and sit), flexibility, and strength in your lower back  Self care:   · Stay active: It is best to be active when you have lumbar radiculopathy  Your physical therapist or healthcare provider may tell you to take walks to ease yourself back into your daily routine  Avoid long periods of bed rest  Bed rest could worsen your symptoms  Do not move in ways that increase your pain  Ask for more information about the best ways to stay active  · Use ice or heat packs:  Use ice or heat packs as directed on the sore area of your body to decrease the pain and swelling  Put ice in a plastic bag covered with a towel on your low back  Cover heated items with a towel to avoid burns  Use ice and heat as directed  · Avoid heavy lifting: Your condition may worsen if you lift heavy things  Avoid lifting if possible  · Maintain a healthy weight:  Excess body weight may strain your back  Talk with your healthcare provider about ways to lose excess weight if you are overweight  Contact your healthcare provider or spine specialist if:   · Your pain does not improve within 1 to 3 weeks after treatment  · Your pain and weakness keep you from your normal activities at work, home, or school  · You lose more than 10 pounds in 6 months without trying  · You become depressed or sad because of the pain  · You have questions or concerns about your condition or care  Return to the emergency department if:   · You have a fever greater than 100 4°F for longer than 2 days      · You have new, severe back or leg pain, or your pain spreads to both legs  · You have any new signs of numbness or weakness, especially in your lower back, legs, arms, or genital area  · You have new trouble controlling your urine and bowel movements  · You do not feel like your bladder empties when you urinate  © 2017 [employer] Information is for End User's use only and may not be sold, redistributed or otherwise used for commercial purposes  All illustrations and images included in CareNotes® are the copyrighted property of [employer]  or [employer]  The above information is an  only  It is not intended as medical advice for individual conditions or treatments  Talk to your doctor, nurse or pharmacist before following any medical regimen to see if it is safe and effective for you

## 2020-08-17 NOTE — PROGRESS NOTES
Assessment/Plan:    Diagnoses and all orders for this visit:    Radiculopathy, lumbar region  -     Ambulatory referral to Physical Therapy; Future  -     XR spine lumbar 2 or 3 views injury; Future    Acute pain of right knee  -     Ambulatory referral to Sports Medicine    Patient presents with chronic right leg pain likely representing a lumbar radiculopathy  Patient states she has had similar symptoms in the past which she describes as sciatica but this seems to of lasted longer  Patient is neurologically intact on exam there are no red flags x-ray was obtained and reviewed today patient may continue the prednisone and transition to NSAIDs, have written for physical therapy  If no improvement due to the chronicity of her symptoms to consider MRI of the lumbar spine  Return in about 6 weeks (around 9/28/2020)  Chief Complaint:     Chief Complaint   Patient presents with    Right Hip - Pain       Subjective:   Patient ID: Anahy Perkins is a 54 y o  female  NP presents referred by PCP Kelly Ayala for diffuse migrating right leg pain since this past fall, denies injury  Pain seems to cause trouble sleeping and worse with resting, improved with activity  Pain is an ache, denies n/t/weakness or changes b/b  She notes "bad back pain" during menstruation  She has been taking advil prn to help sleep, was evaluted by PCP and placed on prednisone, she's noted improved sleep  Denies unintentional weight loss or night sweats  Review of Systems   Constitutional: Negative for fever  Respiratory: Negative for shortness of breath  Cardiovascular: Negative for chest pain  Gastrointestinal: Negative for abdominal pain  Genitourinary: Negative for difficulty urinating  Musculoskeletal: Positive for back pain  Skin: Negative for rash  Neurological: Negative for weakness  Psychiatric/Behavioral: Negative for suicidal ideas         The following portions of the patient's chart were reviewed and updated as appropriate:    Allergy:  No Known Allergies      Past Medical History:   Diagnosis Date    Acute closed-angle glaucoma     Anemia     Asthma        Past Surgical History:   Procedure Laterality Date    EYE SURGERY      MASS EXCISION Right 11/10/2017    Procedure: OPEN EXPLORATION THIRD TOE POSSIBLE REMOVAL OF SOFT TISSUE LESION  FROZEN SECTION,INCISIONAL BONE BIOPSY BASE MIDDLE PHALANAX THIRD TOE;  Surgeon: Diego Rebollar DPM;  Location: [facility];  Service: Podiatry    WISDOM TOOTH EXTRACTION         Social History     Socioeconomic History    Marital status: /Civil Union     Spouse name: Not on file    Number of children: Not on file    Years of education: Not on file    Highest education level: Not on file   Occupational History    Not on file   Social Needs    Financial resource strain: Not on file    Food insecurity     Worry: Not on file     Inability: Not on file    Transportation needs     Medical: Not on file     Non-medical: Not on file   Tobacco Use    Smoking status: Former Smoker    Smokeless tobacco: Never Used    Tobacco comment: quit 20 years ago   Substance and Sexual Activity    Alcohol use: Yes     Frequency: 2-4 times a month     Comment: about 8 to 10 oz weekly    Drug use: No    Sexual activity: Not on file   Lifestyle    Physical activity     Days per week: Not on file     Minutes per session: Not on file    Stress: Not on file   Relationships    Social connections     Talks on phone: Not on file     Gets together: Not on file     Attends Mormonism service: Not on file     Active member of club or organization: Not on file     Attends meetings of clubs or organizations: Not on file     Relationship status: Not on file    Intimate partner violence     Fear of current or ex partner: Not on file     Emotionally abused: Not on file     Physically abused: Not on file     Forced sexual activity: Not on file   Other Topics Concern    Not on file   Social History Narrative    Not on file       Family History   Problem Relation Age of Onset    Cancer Father        Medications:    Current Outpatient Medications:     albuterol (PROVENTIL HFA,VENTOLIN HFA) 90 mcg/act inhaler, Inhale 2 puffs every 6 (six) hours as needed for wheezing, Disp: , Rfl:     ALPRAZolam (XANAX) 0 5 mg tablet, Take 1 tablet (0 5 mg total) by mouth 2 (two) times a day as needed for anxiety, Disp: 30 tablet, Rfl: 1    Fexofenadine-Pseudoephedrine (ALLEGRA-D 24 HOUR PO), Take by mouth, Disp: , Rfl:     predniSONE 20 mg tablet, 20mg BID x 5 days, then 20mg daily x 5 days, Disp: 15 tablet, Rfl: 0    There is no problem list on file for this patient  Objective:  /83 (BP Location: Left arm, Patient Position: Sitting, Cuff Size: Standard)   Pulse 68   Temp 98 9 °F (37 2 °C)   Ht 5' 4" (1 626 m)   Wt 67 2 kg (148 lb 3 2 oz)   BMI 25 44 kg/m²     Back Exam     Range of Motion   The patient has normal back ROM  Muscle Strength   The patient has normal back strength  Tests   Straight leg raise right: negative    Reflexes   Patellar: normal    Other   Toe walk: normal  Heel walk: normal  Sensation: normal  Gait: normal   Erythema: no back redness            Physical Exam  Vitals signs reviewed  Constitutional:       Appearance: She is well-developed  HENT:      Head: Normocephalic and atraumatic  Eyes:      Conjunctiva/sclera: Conjunctivae normal    Neck:      Musculoskeletal: Normal range of motion and neck supple  Cardiovascular:      Rate and Rhythm: Normal rate  Pulmonary:      Effort: Pulmonary effort is normal  No respiratory distress  Skin:     General: Skin is warm and dry  Neurological:      Mental Status: She is alert and oriented to person, place, and time  Psychiatric:         Behavior: Behavior normal            Neurologic Exam     Mental Status   Oriented to person, place, and time         Procedures    I have personally reviewed the written report of the pertinent studies

## 2020-08-19 NOTE — PROGRESS NOTES
PT Evaluation     Today's date: 2020  Patient name: Mumtaz Gilmore  : 1975  MRN: [id number]  Referring provider: Trish Sultana MD  Dx:   Encounter Diagnosis     ICD-10-CM    1  Lumbar radiculopathy  M54 16                   Assessment  Assessment details: Mumtaz Gilmore is a 54 y o  female with a history of asthma, anemia, and glaucoma that presents for a low complexity physical therapy initial evaluation  The patient demonstrates signs and symptoms consistent with lumbar radiculopathy  During the examination the patient demonstrated decreased R LE/ core strength, decreased lumbar extension ROM, and R buttocks pain  The patient's impairments are causing the following functional limitations: difficulty with prolonged standing, prolonged sitting, difficulty bending/stooping, difficulty lifting/carrying objects, and difficulty sleeping at night     The patient's clinical presentation is stable due to a number of participation restrictions, significant medial history, and functional limitation (FOTO 73% function)  The patient will benefit from skilled PT services to address impairments, work towards goals, and restore PLOF  Impairments: abnormal or restricted ROM, activity intolerance, impaired physical strength, lacks appropriate home exercise program, pain with function, poor posture  and poor body mechanics  Functional limitations: difficulty with prolonged standing, prolonged sitting, difficulty bending/stooping, difficulty lifting/carrying objects, and difficulty sleeping at night  Understanding of Dx/Px/POC: good   Prognosis: good    Goals  STG: Achieve in 4-6 weeks  1  Decrease lumbar pain at worst by 50% to improve activity tolerance for self/care and leisure activities  2   Improve R LE/core strength by 1/2 muscle grade to improve ability to change body positions without difficulty    3   Improve lumbar extension ROM to " minimal restriction" to facilitate normal movement patterns for ADL's/work tasks  LTG: Achieve in 8-12 weeks  1  Patient to return to near [facility] as indicated by an increase in FOTO score of: > 80%  2   Patient tolerate sleeping without disturbance and sitting to watch a movie to achieve patient specific goal   3   Patient achieve independence with performing home exercise plan  Plan  Plan details: RE-ASSESS 1X/MONTH  Patient would benefit from: skilled physical therapy  Planned modality interventions: TENS, cryotherapy, thermotherapy: hydrocollator packs and traction  Other planned modality interventions: IASTM  Planned therapy interventions: joint mobilization, manual therapy, massage, neuromuscular re-education, patient education, postural training, self care, strengthening, stretching, therapeutic activities, therapeutic exercise, home exercise program, balance and abdominal trunk stabilization  Frequency: 2-3x/wk  Duration in weeks: 12  Plan of Care beginning date: 2020  Plan of Care expiration date: 2020  Treatment plan discussed with: PTA and patient        Subjective Evaluation    History of Present Illness  Date of onset: 10/9/2019  Mechanism of injury: Jannet Kimball is a 54 y o  female that presents to outpatient physical therapy with complaints of right leg ache, difficulty sleeping, and difficulty tolerating prolonged sitting  The patient reports onset of sciatic pain with pregnancy 18 years ago and has noted on/off episodes of LBP/radicular symptoms  The patient was placed on a prednisone dose and notes improvement  The patient's main goal for physical therapy is to sleep at night without disturbance and tolerate sitting long enough to watch a movie       Lumbar Xray: IMPRESSION:     No acute osseous abnormality          Recurrent probem    Pain  Current pain ratin  At best pain ratin  At worst pain ratin  Location: R lateral thigh / R buttocks  Quality: dull ache  Relieving factors: medications  Progression: improved    Social Support  Steps to enter house: yes  3  Stairs in house: yes   20  Lives in: multiple-level home  Lives with: spouse and adult children    Employment status: working ()  Hand dominance: right    Treatments  Previous treatment: medication  Current treatment: physical therapy  Patient Goals  Patient goals for therapy: decreased pain, increased motion, increased strength, independence with ADLs/IADLs, return to sport/leisure activities and return to work  Patient goal: sleep without disturbance and sit to watch a movie        Objective     Concurrent Complaints  Positive for night pain and disturbed sleep   Negative for bladder dysfunction, bowel dysfunction, saddle (S4) numbness, history of cancer, history of trauma and infection    Additional Special Questions  Night pain/sleep disturbance present since onset Fall 2019    Postural Observations  Seated posture: fair  Standing posture: fair  Correction of posture: makes symptoms better        Palpation     Additional Palpation Details  No tenderness    Neurological Testing     Sensation     Lumbar   Left   Intact: light touch    Right   Intact: light touch    Reflexes   Left   Patellar (L4): normal (2+)  Achilles (S1): normal (2+)  Babinski sign: negative    Right   Patellar (L4): normal (2+)  Achilles (S1): normal (2+)  Babinski sign: negative    Active Range of Motion     Lumbar   Flexion:  Restriction level: minimal  Extension:  with pain Restriction level: moderate  Left lateral flexion:  Restriction level: minimal  Right lateral flexion:  Restriction level: minimal  Left rotation:  Restriction level: minimal  Right rotation:  Restriction level: minimal  Mechanical Assessment    Cervical      Thoracic      Lumbar    Standing extension: repeated movements  Pain location: centralized  Pain intensity: worse  Pain level: decreased    Strength/Myotome Testing     Left Hip   Planes of Motion   Flexion: 5  Extension: 5  Abduction: 5  Adduction: 5    Right Hip Planes of Motion   Flexion: 4  Extension: 4  Abduction: 4+  Adduction: 5    Left Knee   Flexion: 5  Extension: 5    Right Knee   Flexion: 4  Extension: 4    Left Ankle/Foot   Dorsiflexion: 5  Great toe flexion: 5    Right Ankle/Foot   Dorsiflexion: 5  Great toe flexion: 5    Muscle Activation     Additional Muscle Activation Details  Decreased TrA, multifidus, gluteal activation with single leg activities / SLR      Tests     Lumbar     Left   Negative slump test      Right   Negative slump test      Left Pelvic Girdle/Sacrum   Negative: active SLR test      Right Pelvic Girdle/Sacrum   Negative: active SLR test      Additional Tests Details  FOTO: 73%  Graphical documentation             Precautions: NONE    RE-EVAL:9/17    Specialty Daily Treatment Diary     Manual  8/20       STM lumbar paraspinals                Hamstring stretch B/L        Piriformis Stretch B/L        Hip Flexor Stretch B/L            Therapeutic Exercise 8/20       Treadmill Ambulation        UBE Stand ALT FWD/BACK  *                              Bridges  *      Self 90-90 hamstring stretch B/L        Prone press ups x10       Standing back extensions x10       Quad DLS UE  LE  UE+LE  *      LTR cross legs        Clam shells (back stable)  *      Quadruped knee lifts  *      Lean on mat donkey kicks        Neuro Re-ed        Core brace  *      kegels  *      Brace with knee fallouts        Sit ball marches        SLB                Gait Train                Therapeutic Activity        Chair squats            Modalities 8/20       MHP/CP to L/B decline                                 The patient was given a new home exercise plan and towel roll education with written handout, pictures, and verbal instruction  The patient accepts and understands the new home activities

## 2020-08-20 ENCOUNTER — EVALUATION (OUTPATIENT)
Dept: PHYSICAL THERAPY | Facility: CLINIC | Age: 45
End: 2020-08-20
Payer: COMMERCIAL

## 2020-08-20 DIAGNOSIS — M54.16 RADICULOPATHY, LUMBAR REGION: ICD-10-CM

## 2020-08-20 DIAGNOSIS — M54.16 LUMBAR RADICULOPATHY: Primary | ICD-10-CM

## 2020-08-20 PROCEDURE — 97535 SELF CARE MNGMENT TRAINING: CPT | Performed by: PHYSICAL THERAPIST

## 2020-08-20 PROCEDURE — 97110 THERAPEUTIC EXERCISES: CPT | Performed by: PHYSICAL THERAPIST

## 2020-08-20 PROCEDURE — 97161 PT EVAL LOW COMPLEX 20 MIN: CPT | Performed by: PHYSICAL THERAPIST

## 2020-08-25 ENCOUNTER — OFFICE VISIT (OUTPATIENT)
Dept: PHYSICAL THERAPY | Facility: CLINIC | Age: 45
End: 2020-08-25
Payer: COMMERCIAL

## 2020-08-25 DIAGNOSIS — M54.16 LUMBAR RADICULOPATHY: Primary | ICD-10-CM

## 2020-08-25 DIAGNOSIS — M54.16 RADICULOPATHY, LUMBAR REGION: ICD-10-CM

## 2020-08-25 PROCEDURE — 97110 THERAPEUTIC EXERCISES: CPT | Performed by: PHYSICAL THERAPIST

## 2020-08-25 PROCEDURE — 97535 SELF CARE MNGMENT TRAINING: CPT | Performed by: PHYSICAL THERAPIST

## 2020-08-28 ENCOUNTER — OFFICE VISIT (OUTPATIENT)
Dept: PHYSICAL THERAPY | Facility: CLINIC | Age: 45
End: 2020-08-28
Payer: COMMERCIAL

## 2020-08-28 DIAGNOSIS — M54.16 RADICULOPATHY, LUMBAR REGION: ICD-10-CM

## 2020-08-28 DIAGNOSIS — M54.16 LUMBAR RADICULOPATHY: Primary | ICD-10-CM

## 2020-08-28 PROCEDURE — 97535 SELF CARE MNGMENT TRAINING: CPT | Performed by: PHYSICAL THERAPIST

## 2020-08-28 PROCEDURE — 97110 THERAPEUTIC EXERCISES: CPT | Performed by: PHYSICAL THERAPIST

## 2020-08-28 NOTE — PROGRESS NOTES
Daily Note     Today's date: 2020  Patient name: Surekha Maya  : 1975  MRN: [id number]  Referring provider: Francisca Miller MD  Dx:   Encounter Diagnosis     ICD-10-CM    1  Lumbar radiculopathy  M54 16    2  Radiculopathy, lumbar region  M54 16                   Subjective: The patient denies feeling any pain this morning  The patient notices pain increase after performing exercises but feels she is better as she can tolerate sleeping and sitting with less difficulty  Objective: See treatment diary below      Assessment: The patient tolerated all activities well today  The patient needed verbal and manual cues for proper posture and technique to perform the exercises properly  The patient was able to progress her core strengthening program today  There were no complaints of increased pain or problems after the session today  The patient will benefit from continued skilled physical therapy to progress towards achieving patient centered goals  Plan: Continue per plan of care  Progress treatment as tolerated         Precautions: NONE    RE-EVAL:    Specialty Daily Treatment Diary     Manual       STM lumbar paraspinals                Hamstring stretch B/L        Piriformis Stretch B/L        Hip Flexor Stretch B/L            Therapeutic Exercise      Treadmill Ambulation        UBE Stand ALT FWD/BACK  *90/70  X 4mins alternate 90/70 x 6 mins alternate             Piriformis stretch B/L   *2x:30 sit  1x:30 supine     MTP/LTP/ anti rotation   *GRN x10 MTP/LTP  BLK x15 anti rotation     Bridges  *10x:02      Self 90-90 hamstring stretch B/L   3x:30     Prone press ups x10 x15 with exhale/relax x15     Standing back extensions x10 x10 x10     Quad DLS UE  LE  UE+LE  *UE x10  LE x10 UE+LE x10     LTR cross legs        Clam shells (back stable)  *10x:05 x10     Quadruped knee lifts  *x10 x10     Lean on mat donkey kicks        Neuro Re-ed        Core brace *10x:05 review     kegels  *10x:10 review     Brace with knee fallouts        Sit ball marchpaco   *x15     SLB   *2x:30 B/L             Gait Train                Therapeutic Activity        Chair squats            Modalities 8/20 8/25 8/25     MHP/CP to L/B decline Decline decline                           The patient was given a new home exercise plan with written handout, pictures, and verbal instruction  The patient accepts and understands the new home activities

## 2020-08-31 ENCOUNTER — OFFICE VISIT (OUTPATIENT)
Dept: PHYSICAL THERAPY | Facility: CLINIC | Age: 45
End: 2020-08-31
Payer: COMMERCIAL

## 2020-08-31 DIAGNOSIS — M54.16 RADICULOPATHY, LUMBAR REGION: ICD-10-CM

## 2020-08-31 DIAGNOSIS — M54.16 LUMBAR RADICULOPATHY: Primary | ICD-10-CM

## 2020-08-31 PROCEDURE — 97110 THERAPEUTIC EXERCISES: CPT | Performed by: PHYSICAL THERAPIST

## 2020-08-31 PROCEDURE — 97112 NEUROMUSCULAR REEDUCATION: CPT | Performed by: PHYSICAL THERAPIST

## 2020-08-31 NOTE — PROGRESS NOTES
Daily Note     Today's date: 2020  Patient name: Maria E Bolden  : 1975  MRN: [id number]  Referring provider: Miguelito Menezes MD  Dx:   Encounter Diagnosis     ICD-10-CM    1  Lumbar radiculopathy  M54 16    2  Radiculopathy, lumbar region  M54 16                   Subjective: The patient notes she had a rough night feeling pain at her low back every 2 hours last night  The patient does not have pain presently  Objective: See treatment diary below      Assessment: The patient is doing well with the core exercises  -no complaints of pain during or afterwards  The patient needed minimal verbal cues to perform the exercises correctly  The patient notes a return to work tomorrow and is requesting 1 week to perform her exercises at home  The patient will benefit from continued PT to achieve her goals      Plan: The patient will call to schedule 1 more appointment for re-assessment/discharge to an independent HEP       Precautions: NONE    RE-EVAL:    Specialty Daily Treatment Diary     Manual      STM lumbar paraspinals                Hamstring stretch B/L        Piriformis Stretch B/L        Hip Flexor Stretch B/L            Therapeutic Exercise     Treadmill Ambulation    2% 8 mins  1 7 mph    UBE Stand ALT FWD/BACK  *90/70  X 4mins alternate 90/70 x 6 mins alternate             Piriformis stretch B/L   *2x:30 sit  1x:30 supine 2x:30 B/L    MTP/LTP/ anti rotation   *GRN x10 MTP/LTP  BLK x15 anti rotation GRN x15 MTP/LTP  BLK x15 anti rotation    Bridges  *10x:02  15x:02    Self 90-90 hamstring stretch B/L   3x:30 reviewed    Prone press ups x10 x15 with exhale/relax x15 reviewed    Standing back extensions x10 x10 x10 10x    Quad DLS UE  LE  UE+LE  *UE x10  LE x10 UE+LE x10 UE+LE x15    LTR cross legs        Clam shells (back stable)  *10x:05 x10 x15 ea    Quadruped knee lifts  *x10 x10 x15    Lean on mat donkey kicks        Neuro Re-ed        Core brace *10x:05 review 15x:05    jesus  *10x:10 review 15x:05    Brace with knee fallouts        Sit ball marches   *x15 x15    SLB   *2x:30 B/L reviewed            Gait Train                Therapeutic Activity        Chair squats            Modalities 8/20 8/25 8/25 8/31    MHP/CP to L/B decline Decline decline declined

## 2020-09-15 NOTE — PROGRESS NOTES
PT Re-Evaluation     Today's date: 2020  Patient name: Roderick Mosley  : 1975  MRN: [id number]  Referring provider: Min Cobos MD  Dx:   Encounter Diagnosis     ICD-10-CM    1  Lumbar radiculopathy  M54 16    2  Radiculopathy, lumbar region  M54 16                   Assessment  Assessment details: Roderick Mosley is a 54 y o  female with a history of asthma, anemia, and glaucoma that presents for a physical therapy re-evaluation  The patient demonstrates signs and symptoms consistent with lumbar radiculopathy  During the examination the patient demonstrated improved R LE/ core strength, improved lumbar extension ROM, and decreased R buttocks pain  The patient has made functional gains since starting therapy  The patient is now able to sleep through the night without back pain and is tolerating sitting with less R low back pain  The patient continues to have difficulty with prolonged standing/sitting, lifting/carrying heavy objects  The patient will benefit from continued skilled PT to address impairments, work towards goals, and restore patient PLOF  Impairments: abnormal or restricted ROM, activity intolerance, impaired physical strength, lacks appropriate home exercise program, pain with function, poor posture  and poor body mechanics  Functional limitations: difficulty with prolonged standing, prolonged sitting, difficulty bending/stooping, difficulty lifting/carrying objects, and difficulty sleeping at night  Understanding of Dx/Px/POC: good   Prognosis: good    Goals  STG: Achieve in 4-6 weeks  1  Decrease lumbar pain at worst by 50% to improve activity tolerance for self/care and leisure activities  PROGRESSING  2  Improve R LE/core strength by 1/2 muscle grade to improve ability to change body positions without difficulty  PROGRESSING  3  Improve lumbar extension ROM to " minimal restriction" to facilitate normal movement patterns for ADL's/work tasks    MET     LTG: Achieve in 8-12 weeks  1  Patient to return to near [facility] as indicated by an increase in FOTO score of: > 80%  MET 9/16  2  Patient tolerate sleeping without disturbance and sitting to watch a movie to achieve patient specific goal   PROGRESSING  3  Patient achieve independence with performing home exercise plan  PROGRESSING    NEW LTG made 9/16/2020 to be achieved in 4-8 weeks  1  Patient's low back FOTO score to be > 85% to indicate a return of normal function  Plan  Plan details: PATIENT REQUESTS 1X EVERY OTHER WEEK TREATMENT TO FOLOW UP AND PROGRESS THE PROGRAM AS NEEDED  - WILL RE-ASSESS 1X/MONTH  Patient would benefit from: skilled physical therapy  Planned modality interventions: TENS, cryotherapy, thermotherapy: hydrocollator packs and traction  Other planned modality interventions: IASTM  Planned therapy interventions: joint mobilization, manual therapy, massage, neuromuscular re-education, patient education, postural training, self care, strengthening, stretching, therapeutic activities, therapeutic exercise, home exercise program, balance and abdominal trunk stabilization  Frequency: 1X/ EVERY OTHER WK  Duration in weeks: 12  Plan of Care beginning date: 8/20/2020  Plan of Care expiration date: 11/19/2020  Treatment plan discussed with: PTA and patient        Subjective Evaluation    History of Present Illness  Date of onset: 10/9/2019  Mechanism of injury: SUBJECTIVE 9/16/2020: The patient notes she has been experiencing increased pain at her right superior buttocks/ lumbar spine and right knee pain with sitting  The patient notes improvement with sleeping at night  INJURY HISTORY: Jamarcus Juares is a 54 y o  female that presents to outpatient physical therapy with complaints of right leg ache, difficulty sleeping, and difficulty tolerating prolonged sitting    The patient reports onset of sciatic pain with pregnancy 18 years ago and has noted on/off episodes of LBP/radicular symptoms  The patient was placed on a prednisone dose and notes improvement  The patient's main goal for physical therapy is to sleep at night without disturbance and tolerate sitting long enough to watch a movie       Lumbar Xray: IMPRESSION:     No acute osseous abnormality          Recurrent probem    Pain  Current pain ratin  At best pain ratin  At worst pain ratin  Location: R L/B and R knee  Quality: dull ache  Relieving factors: medications  Progression: improved    Social Support  Steps to enter house: yes  3  Stairs in house: yes   20  Lives in: multiple-level home  Lives with: spouse and adult children    Employment status: working ()  Hand dominance: right    Treatments  Previous treatment: medication  Current treatment: physical therapy  Patient Goals  Patient goals for therapy: decreased pain, increased motion, increased strength, independence with ADLs/IADLs, return to sport/leisure activities and return to work  Patient goal: sleep without disturbance and sit to watch a movie        Objective     Concurrent Complaints  Negative for night pain, disturbed sleep, bladder dysfunction, bowel dysfunction, saddle (S4) numbness, history of cancer, history of trauma and infection    Additional Special Questions  Improved sleep disturbance    Postural Observations  Seated posture: good  Standing posture: good  Correction of posture: makes symptoms better        Palpation     Additional Palpation Details  No tenderness    Neurological Testing     Sensation     Lumbar   Left   Intact: light touch    Right   Intact: light touch    Reflexes   Left   Patellar (L4): normal (2+)  Achilles (S1): normal (2+)  Babinski sign: negative    Right   Patellar (L4): normal (2+)  Achilles (S1): normal (2+)  Babinski sign: negative    Active Range of Motion     Lumbar   Flexion:  Restriction level: minimal  Extension:  with pain Restriction level: minimal  Left lateral flexion:  Restriction level: minimal  Right lateral flexion:  Restriction level: minimal  Left rotation:  Restriction level: minimal  Right rotation:  Restriction level: minimal    Additional Active Range of Motion Details  Improved extension  Mechanical Assessment    Cervical      Thoracic      Lumbar    Standing extension: repeated movements  Pain location: centralized  Pain intensity: worse    Strength/Myotome Testing     Left Hip   Planes of Motion   Flexion: 5  Extension: 5  Abduction: 5  Adduction: 5    Right Hip   Planes of Motion   Flexion: 4+  Extension: 4+  Abduction: 4+  Adduction: 5    Left Knee   Flexion: 5  Extension: 5    Right Knee   Flexion: 4  Extension: 4    Left Ankle/Foot   Dorsiflexion: 5  Great toe flexion: 5    Right Ankle/Foot   Dorsiflexion: 5  Great toe flexion: 5    Muscle Activation     Additional Muscle Activation Details  Decreased TrA, multifidus, gluteal activation with single leg activities / SLR      Tests     Lumbar     Left   Negative slump test      Right   Negative slump test      Left Pelvic Girdle/Sacrum   Negative: active SLR test      Right Pelvic Girdle/Sacrum   Negative: active SLR test      Additional Tests Details  FOTO: 83% ( improved 10%)  Graphical documentation             Precautions: NONE    RE-EVAL:10/15  Specialty Daily Treatment Diary     Manual   8/25 8/28 8/31 9/16   STM lumbar paraspinals                Hamstring stretch B/L        Piriformis Stretch B/L        Hip Flexor Stretch B/L            Therapeutic Exercise  8/25 8/28 8/31 9/16   Treadmill Ambulation    2% 8 mins  1 7 mph    UBE Stand ALT FWD/BACK  *90/70  X 4mins alternate 90/70 x 6 mins alternate  90/70 X 8 MINS           Piriformis stretch B/L   *2x:30 sit  1x:30 supine 2x:30 B/L    MTP/LTP/ anti rotation   *GRN x10 MTP/LTP  BLK x15 anti rotation GRN x15 MTP/LTP  BLK x15 anti rotation    Bridges  *10x:02  15x:02    Self 90-90 hamstring stretch B/L   3x:30 reviewed REVIEWED   Prone press ups  x15 with exhale/relax x15 reviewed WITH O P  X10  WITHOUT O P  X 5   Standing back extensions  x10 x10 10x 15X   Quad DLS UE  LE  UE+LE  *UE x10  LE x10 UE+LE x10 UE+LE x15 REVIEWED   LTR cross legs        Clam shells (back stable)  *10x:05 x10 x15 ea REVIEWED   Quadruped knee lifts  *x10 x10 x15    Lean on mat donkey kicks        Neuro Re-ed        Core brace  *10x:05 review 15x:05    kegels  *10x:10 review 15x:05    Brace with knee fallouts        Sit ball marches   *x15 x15    SLB   *2x:30 B/L reviewed            Gait Train                Therapeutic Activity        Chair squats            Modalities  8/25 8/25 8/31 9/16   MHP/CP to L/B  Decline decline declined DECLINED

## 2020-09-16 ENCOUNTER — EVALUATION (OUTPATIENT)
Dept: PHYSICAL THERAPY | Facility: CLINIC | Age: 45
End: 2020-09-16
Payer: COMMERCIAL

## 2020-09-16 DIAGNOSIS — M54.16 RADICULOPATHY, LUMBAR REGION: ICD-10-CM

## 2020-09-16 DIAGNOSIS — M54.16 LUMBAR RADICULOPATHY: Primary | ICD-10-CM

## 2020-09-16 PROCEDURE — 97110 THERAPEUTIC EXERCISES: CPT | Performed by: PHYSICAL THERAPIST

## 2020-10-01 ENCOUNTER — OFFICE VISIT (OUTPATIENT)
Dept: PHYSICAL THERAPY | Facility: CLINIC | Age: 45
End: 2020-10-01
Payer: COMMERCIAL

## 2020-10-01 DIAGNOSIS — M54.16 LUMBAR RADICULOPATHY: Primary | ICD-10-CM

## 2020-10-01 DIAGNOSIS — M54.16 RADICULOPATHY, LUMBAR REGION: ICD-10-CM

## 2020-10-01 PROCEDURE — 97110 THERAPEUTIC EXERCISES: CPT | Performed by: PHYSICAL THERAPIST

## 2021-03-26 DIAGNOSIS — Z23 ENCOUNTER FOR IMMUNIZATION: ICD-10-CM

## 2021-05-07 DIAGNOSIS — F41.9 ANXIETY: ICD-10-CM

## 2021-05-07 RX ORDER — ALPRAZOLAM 0.5 MG/1
0.5 TABLET ORAL 2 TIMES DAILY PRN
Qty: 30 TABLET | Refills: 0 | Status: SHIPPED | OUTPATIENT
Start: 2021-05-07 | End: 2021-12-13 | Stop reason: SDUPTHER

## 2021-11-16 DIAGNOSIS — K21.9 GASTROESOPHAGEAL REFLUX DISEASE WITHOUT ESOPHAGITIS: Primary | ICD-10-CM

## 2021-12-13 ENCOUNTER — OFFICE VISIT (OUTPATIENT)
Dept: FAMILY MEDICINE CLINIC | Facility: CLINIC | Age: 46
End: 2021-12-13
Payer: COMMERCIAL

## 2021-12-13 VITALS
HEIGHT: 64 IN | SYSTOLIC BLOOD PRESSURE: 118 MMHG | HEART RATE: 84 BPM | OXYGEN SATURATION: 98 % | RESPIRATION RATE: 20 BRPM | DIASTOLIC BLOOD PRESSURE: 68 MMHG | WEIGHT: 157 LBS | BODY MASS INDEX: 26.8 KG/M2 | TEMPERATURE: 98 F

## 2021-12-13 DIAGNOSIS — Z23 ENCOUNTER FOR IMMUNIZATION: ICD-10-CM

## 2021-12-13 DIAGNOSIS — Z00.00 ANNUAL PHYSICAL EXAM: Primary | ICD-10-CM

## 2021-12-13 DIAGNOSIS — F41.9 ANXIETY: ICD-10-CM

## 2021-12-13 DIAGNOSIS — J45.20 MILD INTERMITTENT ASTHMA WITHOUT COMPLICATION: ICD-10-CM

## 2021-12-13 DIAGNOSIS — E66.3 OVERWEIGHT WITH BODY MASS INDEX (BMI) OF 26 TO 26.9 IN ADULT: ICD-10-CM

## 2021-12-13 PROCEDURE — 3725F SCREEN DEPRESSION PERFORMED: CPT | Performed by: NURSE PRACTITIONER

## 2021-12-13 PROCEDURE — 90686 IIV4 VACC NO PRSV 0.5 ML IM: CPT

## 2021-12-13 PROCEDURE — 1036F TOBACCO NON-USER: CPT | Performed by: NURSE PRACTITIONER

## 2021-12-13 PROCEDURE — 90471 IMMUNIZATION ADMIN: CPT

## 2021-12-13 PROCEDURE — 99396 PREV VISIT EST AGE 40-64: CPT | Performed by: NURSE PRACTITIONER

## 2021-12-13 PROCEDURE — 3008F BODY MASS INDEX DOCD: CPT | Performed by: NURSE PRACTITIONER

## 2021-12-13 RX ORDER — ALBUTEROL SULFATE 90 UG/1
2 AEROSOL, METERED RESPIRATORY (INHALATION) EVERY 6 HOURS PRN
Qty: 18 G | Refills: 1 | Status: SHIPPED | OUTPATIENT
Start: 2021-12-13

## 2021-12-13 RX ORDER — ALPRAZOLAM 0.5 MG/1
0.5 TABLET ORAL 2 TIMES DAILY PRN
Qty: 30 TABLET | Refills: 1 | Status: SHIPPED | OUTPATIENT
Start: 2021-12-13

## 2021-12-15 ENCOUNTER — TELEPHONE (OUTPATIENT)
Dept: ADMINISTRATIVE | Facility: OTHER | Age: 46
End: 2021-12-15

## 2022-07-28 LAB — COLOGUARD RESULT REPORTABLE: NEGATIVE

## 2022-10-24 ENCOUNTER — RA CDI HCC (OUTPATIENT)
Dept: OTHER | Facility: HOSPITAL | Age: 47
End: 2022-10-24

## 2022-10-24 NOTE — PROGRESS NOTES
[facility]  coding opportunities       Chart reviewed, no opportunity found: CHART REVIEWED, NO OPPORTUNITY FOUND        Patients Insurance        Commercial Insurance: [employer]

## 2022-10-25 ENCOUNTER — OFFICE VISIT (OUTPATIENT)
Dept: FAMILY MEDICINE CLINIC | Facility: CLINIC | Age: 47
End: 2022-10-25
Payer: COMMERCIAL

## 2022-10-25 ENCOUNTER — TELEPHONE (OUTPATIENT)
Dept: ADMINISTRATIVE | Facility: OTHER | Age: 47
End: 2022-10-25

## 2022-10-25 VITALS
HEIGHT: 64 IN | TEMPERATURE: 98.4 F | DIASTOLIC BLOOD PRESSURE: 62 MMHG | BODY MASS INDEX: 26.29 KG/M2 | HEART RATE: 76 BPM | SYSTOLIC BLOOD PRESSURE: 108 MMHG | OXYGEN SATURATION: 98 % | WEIGHT: 154 LBS

## 2022-10-25 DIAGNOSIS — M79.621 PAIN OF RIGHT UPPER ARM: ICD-10-CM

## 2022-10-25 DIAGNOSIS — G44.209 TENSION HEADACHE: ICD-10-CM

## 2022-10-25 DIAGNOSIS — S16.1XXA STRAIN OF NECK MUSCLE, INITIAL ENCOUNTER: Primary | ICD-10-CM

## 2022-10-25 PROCEDURE — 99214 OFFICE O/P EST MOD 30 MIN: CPT | Performed by: NURSE PRACTITIONER

## 2022-10-25 RX ORDER — CYCLOBENZAPRINE HCL 5 MG
5 TABLET ORAL 3 TIMES DAILY PRN
Qty: 30 TABLET | Refills: 0 | Status: SHIPPED | OUTPATIENT
Start: 2022-10-25

## 2022-10-25 RX ORDER — PREDNISONE 20 MG/1
TABLET ORAL
Qty: 12 TABLET | Refills: 0 | Status: SHIPPED | OUTPATIENT
Start: 2022-10-25 | End: 2022-11-02

## 2022-10-25 NOTE — TELEPHONE ENCOUNTER
----- Message from Ida Dance sent at 10/25/2022  8:49 AM EDT -----  Regarding: brianna  10/25/22 8:50 AM    Hello, our patient Eulalia Hollingsworth has had CRC: Brianna completed/performed  Please assist in updating the patient chart by pulling the Care Everywhere (CE) document  The date of service is 07/19/2022       Thank you,  Ida Dance PG [place]

## 2022-10-25 NOTE — LETTER
Procedure Request Form: Cervical Cancer Screening      Date Requested: 10/25/22  Patient: Joann Bright  Patient : 1975   Referring Provider: Patti Escobar, CRNP        Date of Procedure ______________________________       The above patient has informed us that they have completed their   most recent Cervical Cancer Screening at your facility  Please complete   this form and attach all corresponding procedure reports/results  Comments __________________________________________________________  ____________________________________________________________________  ____________________________________________________________________  ____________________________________________________________________    Facility Completing Procedure _________________________________________    Form Completed By (print name) _______________________________________      Signature __________________________________________________________      These reports are needed for  compliance  Please fax this completed form and a copy of the procedure report to our office located at [place] [address] as soon as possible to [phone] fabian Jaeger: Phone [phone]    We thank you for your assistance in treating our mutual patient

## 2022-10-25 NOTE — TELEPHONE ENCOUNTER
----- Message from Vanessa Schofield sent at 10/25/2022  8:50 AM EDT -----  Regarding: Mammo  10/25/22 8:50 AM    Hello, our patient Tracy Morris has had Mammogram completed/performed  Please assist in updating the patient chart by pulling the Care Everywhere (CE) document  The date of service is 8/31/2022       Thank you,  Vanessa Chavez

## 2022-10-25 NOTE — PATIENT INSTRUCTIONS
Take medication as prescribed  Heat to neck  Stretching  May need physical therapy if no improvement in the next 2-3 weeks

## 2022-10-25 NOTE — PROGRESS NOTES
[facility]        NAME: Sathya Carlton is a 49 y o  female  : 1975    MRN: [id number]  DATE: 2022  TIME: 9:01 AM    Assessment and Plan   Strain of neck muscle, initial encounter [S16  1XXA]  1  Strain of neck muscle, initial encounter  predniSONE 20 mg tablet    cyclobenzaprine (FLEXERIL) 5 mg tablet   2  Tension headache  predniSONE 20 mg tablet    cyclobenzaprine (FLEXERIL) 5 mg tablet   3  Pain of right upper arm  predniSONE 20 mg tablet    cyclobenzaprine (FLEXERIL) 5 mg tablet   1  Strain of neck muscle, initial encounter  -will trial prednisone and muscle relaxer, continue heat and stretching  Follow up if no improvement in the next 2-3 weeks  - predniSONE 20 mg tablet; Take 1 tablet (20 mg total) by mouth 2 (two) times a day with meals for 4 days, THEN 1 tablet (20 mg total) daily for 4 days  Dispense: 12 tablet; Refill: 0  - cyclobenzaprine (FLEXERIL) 5 mg tablet; Take 1 tablet (5 mg total) by mouth 3 (three) times a day as needed for muscle spasms  Dispense: 30 tablet; Refill: 0    2  Tension headache    - predniSONE 20 mg tablet; Take 1 tablet (20 mg total) by mouth 2 (two) times a day with meals for 4 days, THEN 1 tablet (20 mg total) daily for 4 days  Dispense: 12 tablet; Refill: 0  - cyclobenzaprine (FLEXERIL) 5 mg tablet; Take 1 tablet (5 mg total) by mouth 3 (three) times a day as needed for muscle spasms  Dispense: 30 tablet; Refill: 0    3  Pain of right upper arm    - predniSONE 20 mg tablet; Take 1 tablet (20 mg total) by mouth 2 (two) times a day with meals for 4 days, THEN 1 tablet (20 mg total) daily for 4 days  Dispense: 12 tablet; Refill: 0  - cyclobenzaprine (FLEXERIL) 5 mg tablet; Take 1 tablet (5 mg total) by mouth 3 (three) times a day as needed for muscle spasms  Dispense: 30 tablet; Refill: 0        Patient Instructions     There are no Patient Instructions on file for this visit          Chief Complaint     Chief Complaint   Patient presents with   • Neck Pain     Patient reports right sided neck and arm pain  Started about a month - 5 weeks ago  History of Present Illness       Patient here for an acute visit with c/o neck and arm pain on the right  Was helping move her parents about 4-5 weeks ago  iniitally was right sided neck pain and then 1 week later started with right arm pain when moving furniture  Decreased ROM, ongoing pain in neck and  Arm has tried stretching, heat, ibuprofen PM at night  Taking tylenol for HA when severe  Denies history of surgeries  Denies numbness or tingling  Review of Systems   Review of Systems   Constitutional: Negative  Negative for fatigue and fever  Respiratory: Negative  Negative for shortness of breath and wheezing  Cardiovascular: Negative  Negative for chest pain and palpitations  Musculoskeletal: Positive for arthralgias, myalgias and neck pain  Negative for back pain  Skin: Negative  Negative for rash  Neurological: Negative  Negative for dizziness, light-headedness and headaches         PHQ-2/9 Depression Screening    Little interest or pleasure in doing things: 0 - not at all  Feeling down, depressed, or hopeless: 0 - not at all  PHQ-2 Score: 0  PHQ-2 Interpretation: Negative depression screen        Current Medications       Current Outpatient Medications:   •  albuterol (PROVENTIL HFA,VENTOLIN HFA) 90 mcg/act inhaler, Inhale 2 puffs every 6 (six) hours as needed for wheezing, Disp: 18 g, Rfl: 1  •  ALPRAZolam (XANAX) 0 5 mg tablet, Take 1 tablet (0 5 mg total) by mouth 2 (two) times a day as needed for anxiety, Disp: 30 tablet, Rfl: 1  •  cyclobenzaprine (FLEXERIL) 5 mg tablet, Take 1 tablet (5 mg total) by mouth 3 (three) times a day as needed for muscle spasms, Disp: 30 tablet, Rfl: 0  •  Fexofenadine-Pseudoephedrine (ALLEGRA-D 24 HOUR PO), Take by mouth, Disp: , Rfl:   •  predniSONE 20 mg tablet, Take 1 tablet (20 mg total) by mouth 2 (two) times a day with meals for 4 days, THEN 1 tablet (20 mg total) daily for 4 days  , Disp: 12 tablet, Rfl: 0    Current Allergies     Allergies as of 10/25/2022   • (No Known Allergies)            The following portions of the patient's history were reviewed and updated as appropriate: allergies, current medications, past family history, past medical history, past social history, past surgical history and problem list      Past Medical History:   Diagnosis Date   • Acute closed-angle glaucoma    • Anemia    • Asthma        Past Surgical History:   Procedure Laterality Date   • EYE SURGERY     • MASS EXCISION Right 11/10/2017    Procedure: OPEN EXPLORATION THIRD TOE POSSIBLE REMOVAL OF SOFT TISSUE [address] [facility] TOE;  Surgeon: Shaneka Forte DPM;  Location: [facility];  Service: Podiatry   • WISDOM TOOTH EXTRACTION         Family History   Problem Relation Age of Onset   • Cancer Father          Medications have been verified  Objective   /62   Pulse 76   Temp 98 4 °F (36 9 °C)   Ht 5' 4" (1 626 m)   Wt 69 9 kg (154 lb)   SpO2 98%   BMI 26 43 kg/m²        Physical Exam     Physical Exam  Vitals and nursing note reviewed  Constitutional:       General: She is not in acute distress  Appearance: She is well-developed  She is not ill-appearing  HENT:      Head: Normocephalic and atraumatic  Neck:      Trachea: No tracheal deviation  Comments:  Lateral rotation decrease in neck to the right  Forward flexion intact which is improved from a few weeks ago  Pulmonary:      Effort: Pulmonary effort is normal  No tachypnea, accessory muscle usage or respiratory distress  Breath sounds: No stridor  Musculoskeletal:        Arms:       Cervical back: No rigidity  Decreased range of motion  Neurological:      Mental Status: She is alert and oriented to person, place, and time     Psychiatric:         Speech: Speech normal          Behavior: Behavior normal  Behavior is cooperative

## 2022-10-25 NOTE — TELEPHONE ENCOUNTER
Upon review of the In Basket request and the patient's chart, initial outreach has been made via fax to facility  , please see Contacts section for details   -pap    Thank you  Sanjay Wagner

## 2022-10-25 NOTE — TELEPHONE ENCOUNTER
Upon review of the In Basket request we were able to locate, review, and update the patient chart as requested for Mammogram     Any additional questions or concerns should be emailed to the Practice Liaisons via the appropriate education email address, please do not reply via In Basket      Thank you  Ly Sandhu

## 2022-10-25 NOTE — TELEPHONE ENCOUNTER
----- Message from Elva Leiva sent at 10/25/2022  8:48 AM EDT -----  Regarding: pap  10/25/22 8:48 AM    Hello, our patient Esha Sierra has had Pap Smear (HPV) aka Cervical Cancer Screening completed/performed  Please assist in updating the patient chart by making an External outreach to Dr Satinder Pryor's office located in [place]  The date of service is August 2022      Thank you,  Elva Chavez

## 2022-11-15 ENCOUNTER — APPOINTMENT (OUTPATIENT)
Dept: RADIOLOGY | Facility: CLINIC | Age: 47
End: 2022-11-15

## 2022-11-15 DIAGNOSIS — M54.2 PAIN OF CERVICAL SPINE: ICD-10-CM

## 2022-12-01 ENCOUNTER — EVALUATION (OUTPATIENT)
Dept: PHYSICAL THERAPY | Facility: CLINIC | Age: 47
End: 2022-12-01

## 2022-12-01 DIAGNOSIS — M79.601 PAIN OF RIGHT UPPER EXTREMITY: ICD-10-CM

## 2022-12-01 DIAGNOSIS — M54.2 PAIN OF CERVICAL SPINE: Primary | ICD-10-CM

## 2022-12-01 NOTE — PROGRESS NOTES
PT Evaluation     Today's date: 2022  Patient name: Ginger Interiano  : 1975  MRN: [id number]  Referring provider: JULIÁN Torrez  Dx:   Encounter Diagnosis     ICD-10-CM    1  Pain of cervical spine  M54 2 Ambulatory Referral to Physical Therapy      2  Pain of right upper extremity  M79 601           Start Time: 1530  Stop Time: 1615  Total time in clinic (min): 45 minutes    Assessment  Assessment details:   Ginger Interiano was seen for an initial PT evaluation today  Patient is a 49 y o  female with diagnosis of cervicalgia with R UE pain and past medical history significant for anemia, LBP with radic, eye surgery, asthma  Moderate complexity evaluation  due to number of participation restrictions, functional outcome measure of 41% limitation, and evolving clinical presentation  Findings today show limitation in cervical range of motion and mobility with muscular tightness, pain and HA's impacting overall functional mobility including ability to drive, turn head, lifting, reaching  Skilled PT indicated to treat at this time to address above stated deficits and return patient to PLOF  Impairments: abnormal muscle tone, abnormal or restricted ROM, activity intolerance, impaired physical strength, lacks appropriate home exercise program, pain with function, scapular dyskinesis, poor posture  and poor body mechanics  Functional limitations: lifitng, twisting, carrying, reaching  Goals  STG (6 weeks)  1  Patient will display good seated posture with decreased forward head and retracted shoulders for 2 consecutive sessions with 50% VC    2  Patient will have 0/10 cervical pain at rest    3  Improve cervical rotation by 25% for improved ability to drive  LTG (12 weeks)  1  Improved FOTO score by 10% for improved improved overall function  2  Patient will be able to drive with 8/28 pain for return to PLOF     3  Patient will be independent with home exercise program for continued maintenance post PT DC  Plan  Plan details: Progress note in 4 weeks  Patient would benefit from: skilled physical therapy  Planned modality interventions: unattended electrical stimulation, thermotherapy: hydrocollator packs and cryotherapy  Planned therapy interventions: manual therapy, neuromuscular re-education, therapeutic activities, therapeutic exercise, self care and home exercise program  Frequency: 2x week  Duration in weeks: 12  Plan of Care beginning date: 2022  Plan of Care expiration date: 3/1/2023  Treatment plan discussed with: patient and PTA        Subjective Evaluation    History of Present Illness  Date of onset: 2022  Mechanism of injury: Wing Reyes is a 49 y o  female who presents to outpatient Physical Therapy today with complaints of neck pain with R UE pain  Also with c/o HA  Pain began in September after helping to move parents  Saw PCP who Rx prednisone and mm relaxer which seemed to help shoulder/arm but not neck  X-ray IMPRESSION:     Multilevel degenerative changes of cervical spine  History of lumbar pain into RLE  Pain  Current pain ratin  At best pain ratin  At worst pain ratin  Location: R c/s into posterior R UE  Quality: sharp  Relieving factors: rest    Social Support    Employment status: working (desk work (computer))  Hand dominance: right      Diagnostic Tests  X-ray: abnormal  Patient Goals  Patient goals for therapy: decreased pain  Patient goal: turn head while driving        Objective     Concurrent Complaints  Positive for headaches (2-3x/day)   Negative for dizziness, tinnitus, trouble swallowing, difficulty breathing, visual change and history of trauma    Postural Observations    Additional Postural Observation Details  Upper crossed    Neurological Testing     Sensation   Cervical/Thoracic   Left   Intact: light touch    Right   Intact: light touch    Reflexes   Left   Torres's reflex: negative    Right   Torres's reflex: negative    Active Range of Motion   Cervical/Thoracic Spine       Cervical    Flexion: 25 degrees  with pain  Extension: 20 degrees     with pain  Left lateral flexion: 25 degrees     with pain  Right lateral flexion: 20 degrees     with pain  Left rotation: 20 degrees with pain  Right rotation: 35 degrees    with pain    Right Shoulder   Flexion: 150 degrees with pain  Abduction: 145 degrees   External rotation BTH: C7   Internal rotation BTB: T10 with pain    Joint Play     Comments: Elevated R 1st rib  Mechanical Assessment    Cervical    Seated retraction: repeated movements   Pain location: no change  Pain intensity: worse  Seated Flexion:  repeated movements  Pain location: no change  Seated Extension: repeated movements  Pain location: no change    Thoracic      Lumbar      Strength/Myotome Testing     Right Shoulder     Planes of Motion   Flexion: 5   Abduction: 4+   External rotation at 0°: 5   Internal rotation at 0°: 5     Isolated Muscles   Biceps: 5   Triceps: 5     Tests   Cervical   Positive craniocervical flexion test   Negative vertical compression, alar ligament test, Sharp-Trina test, VBI and neck flexor muscle endurance test      Left   Positive cervical flexion-rotation test     Negative Spurling's Test B  Right   Positive cervical flexion-rotation test    Negative Spurling's Test B  Lumbar   Negative vertical compression  Additional Tests Details  Pain with spurlings, no radicular symptoms  General Comments:      Cervical/Thoracic Comments    FOTO: 59% function, 70% predicted function     Neuro Exam:     Headaches   Patient reports headaches: Yes (2-3x/day)  Precautions: none noted  Access code: [email or web address]  Progress note: 1/1  POC: 3/1    Manuals 12/1       stretching *       SOR 1'x3       Cervical traction 1'       C/S PA and side glide mobs Grade III       T/S PA mobs        Scapular PNF        IASTM ? *       Neuro Re-Ed        UBE *       scap retraction Shoulder rolls        Chin tucks :03x10       Supine chin tuck head lift        MTP/LTP        SNAG  Rot  ext   10x bilat  *       Ther Ex        UT stretch        LS stretch                Doorway stretch        C/S AROM *                                                                                               Ther Activity                        Gait Training                        Modalities

## 2022-12-05 ENCOUNTER — OFFICE VISIT (OUTPATIENT)
Dept: PHYSICAL THERAPY | Facility: CLINIC | Age: 47
End: 2022-12-05

## 2022-12-05 DIAGNOSIS — M79.601 PAIN OF RIGHT UPPER EXTREMITY: ICD-10-CM

## 2022-12-05 DIAGNOSIS — M54.2 PAIN OF CERVICAL SPINE: Primary | ICD-10-CM

## 2022-12-05 NOTE — PROGRESS NOTES
Daily Note     Today's date: 2022  Patient name: Neri Mcdonough  : 1975  MRN: [id number]  Referring provider: JULIÁN Baker  Dx:   Encounter Diagnosis     ICD-10-CM    1  Pain of cervical spine  M54 2       2  Pain of right upper extremity  M79 601           Start Time: 1455  Stop Time: 1540  Total time in clinic (min): 45 minutes    Subjective: Patient states she is feeling "good"  No new complaints  Has not had sharp pains or HA  Objective: See treatment diary below      Assessment: Tolerated treatment well  Pain free chin tucks and cervical extension  Pressure at posterior head with cervical flexion  Will continue to have right shoulder pain with lifting activities  Improvement in all active cervical ranges today  Verbal cueing to reduce compensatory head movements  Patient given updated home exercise program  Patient would benefit from continued PT      Plan: Continue per plan of care  Progress treatment as tolerated  Precautions: none noted  Access code: [email or web address]  Progress note:   POC: 3/1    Manuals       stretching * UT & LS      SOR 1'x3 1'x3      Cervical traction 1' 1'x3      C/S PA and side glide mobs Grade III Grade III-IV with UPA rotation      T/S PA mobs  1st rib grade III-IV      Scapular PNF        IASTM ? *       Neuro Re-Ed        UBE * L2 seated 5 min      scap retraction        Shoulder rolls        Chin tucks :03x10 :03x10      Supine chin tuck head lift        MTP/LTP        SNAG  Rot  ext   10x bilat  *   10x bilat      Ther Ex        UT stretch  3x:30 bilat      LS stretch  3x:30 bilat              Doorway stretch        C/S AROM * 10x ea                                                                                              Ther Activity                        Gait Training                        Modalities

## 2022-12-14 ENCOUNTER — APPOINTMENT (OUTPATIENT)
Dept: PHYSICAL THERAPY | Facility: CLINIC | Age: 47
End: 2022-12-14

## 2022-12-19 ENCOUNTER — OFFICE VISIT (OUTPATIENT)
Dept: FAMILY MEDICINE CLINIC | Facility: CLINIC | Age: 47
End: 2022-12-19

## 2022-12-19 VITALS
HEIGHT: 64 IN | OXYGEN SATURATION: 99 % | TEMPERATURE: 98.8 F | WEIGHT: 163.4 LBS | HEART RATE: 71 BPM | BODY MASS INDEX: 27.9 KG/M2 | RESPIRATION RATE: 16 BRPM | DIASTOLIC BLOOD PRESSURE: 62 MMHG | SYSTOLIC BLOOD PRESSURE: 118 MMHG

## 2022-12-19 DIAGNOSIS — E66.3 OVERWEIGHT WITH BODY MASS INDEX (BMI) OF 28 TO 28.9 IN ADULT: ICD-10-CM

## 2022-12-19 DIAGNOSIS — Z00.00 ANNUAL PHYSICAL EXAM: Primary | ICD-10-CM

## 2022-12-19 NOTE — PATIENT INSTRUCTIONS
Restart Vitamin D 3160-9380 IU daily  Declined bloodwork orders today, if you change your mind and want labs done send [facility] message or call office  Yearly physical or call sooner for problems/concerns

## 2022-12-19 NOTE — PROGRESS NOTES
HPI:  Jose A Gardiner is a 49 y o  female here for her yearly health maintenance exam    There is no problem list on file for this patient  Past Medical History:   Diagnosis Date   • Acute closed-angle glaucoma    • Anemia    • Asthma        1  Advanced Directive: na     2  Durable Power of  for Healthcare: na     3  Social History:               Marital History:               Work Status: working at [employer]                 4  General Health: good health              Regular Dental Visits:yes              Vision problems:none              Hearing Loss:none              Immunizations up to date:                 Lifestyle:                           Healthy Diet:regular diet                          Tobacco Use:none                          Regular exercise:was walking- has not been                            PHQ-2/9 Depression Screening    Little interest or pleasure in doing things: 0 - not at all  Feeling down, depressed, or hopeless: 0 - not at all  PHQ-2 Score: 0  PHQ-2 Interpretation: Negative depression screen           Current Outpatient Medications   Medication Sig Dispense Refill   • albuterol (PROVENTIL HFA,VENTOLIN HFA) 90 mcg/act inhaler Inhale 2 puffs every 6 (six) hours as needed for wheezing 18 g 1   • ALPRAZolam (XANAX) 0 5 mg tablet Take 1 tablet (0 5 mg total) by mouth 2 (two) times a day as needed for anxiety 30 tablet 1   • Fexofenadine-Pseudoephedrine (ALLEGRA-D 24 HOUR PO) Take by mouth       No current facility-administered medications for this visit       No Known Allergies  Immunization History   Administered Date(s) Administered   • INFLUENZA 12/13/2021   • Influenza Injectable, MDCK, Preservative Free, Quadrivalent, 0 5 mL 11/10/2019   • Influenza, injectable, quadrivalent, preservative free 0 5 mL 12/13/2021       Patient Care Team:  Luis Palafox as PCP - General (Family Medicine)  Luis Palafox as PCP - PCP-[facility] Attributed-Roster    Review of Systems   Constitutional: Negative for activity change, diaphoresis, fatigue and fever  HENT: Negative for congestion, facial swelling, hearing loss, rhinorrhea, sinus pressure, sinus pain, sneezing, sore throat and voice change  Eyes: Negative for discharge and visual disturbance  Respiratory: Negative for cough, choking, chest tightness, shortness of breath, wheezing and stridor  Cardiovascular: Negative for chest pain, palpitations and leg swelling  Gastrointestinal: Negative for abdominal distention, abdominal pain, constipation, diarrhea, nausea and vomiting  Endocrine: Negative for polydipsia, polyphagia and polyuria  Genitourinary: Negative for difficulty urinating, dysuria, frequency and urgency  Musculoskeletal: Positive for arthralgias, myalgias, neck pain and neck stiffness  Negative for back pain, gait problem and joint swelling  Skin: Negative for color change, rash and wound  Neurological: Negative for dizziness, syncope, speech difficulty, weakness, light-headedness and headaches  Hematological: Negative for adenopathy  Does not bruise/bleed easily  Psychiatric/Behavioral: Negative for agitation, behavioral problems, confusion, hallucinations, sleep disturbance and suicidal ideas  The patient is not nervous/anxious  BMI Counseling: Body mass index is 28 05 kg/m²  The BMI is above normal  Nutrition recommendations include decreasing portion sizes, encouraging healthy choices of fruits and vegetables, decreasing fast food intake, consuming healthier snacks, limiting drinks that contain sugar, moderation in carbohydrate intake and increasing intake of lean protein  Exercise recommendations include exercising 3-5 times per week  Rationale for BMI follow-up plan is due to patient being overweight or obese  Depression Screening and Follow-up Plan: Patient was screened for depression during today's encounter   They screened negative with a PHQ-2 score of 0  Physical Exam :  Physical Exam  Vitals and nursing note reviewed  Constitutional:       General: She is not in acute distress  Appearance: Normal appearance  She is well-developed  She is not diaphoretic  HENT:      Head: Normocephalic and atraumatic  Right Ear: Tympanic membrane, ear canal and external ear normal       Left Ear: Tympanic membrane, ear canal and external ear normal       Nose: Nose normal       Right Sinus: No maxillary sinus tenderness or frontal sinus tenderness  Left Sinus: No maxillary sinus tenderness or frontal sinus tenderness  Mouth/Throat:      Mouth: Mucous membranes are moist       Pharynx: Oropharynx is clear  Uvula midline  No oropharyngeal exudate  Eyes:      General:         Right eye: No discharge  Left eye: No discharge  Conjunctiva/sclera: Conjunctivae normal       Pupils: Pupils are equal, round, and reactive to light  Neck:      Thyroid: No thyromegaly  Trachea: No tracheal deviation  Cardiovascular:      Rate and Rhythm: Normal rate and regular rhythm  Heart sounds: Normal heart sounds  No murmur heard  No friction rub  No gallop  Pulmonary:      Effort: Pulmonary effort is normal  No respiratory distress  Breath sounds: Normal breath sounds  No wheezing or rales  Abdominal:      General: Bowel sounds are normal  There is no distension  Palpations: Abdomen is soft  There is no mass  Tenderness: There is no abdominal tenderness  There is no guarding or rebound  Musculoskeletal:         General: No tenderness or deformity  Normal range of motion  Cervical back: Normal range of motion and neck supple  Lymphadenopathy:      Cervical: No cervical adenopathy  Skin:     General: Skin is warm and dry  Findings: No erythema or rash  Neurological:      Mental Status: She is alert and oriented to person, place, and time  Cranial Nerves: No cranial nerve deficit  Coordination: Coordination normal    Psychiatric:         Mood and Affect: Mood normal          Speech: Speech normal          Behavior: Behavior normal          Thought Content: Thought content normal          Judgment: Judgment normal            Reviewed Updated [facility] Prior Wellness Visits:   Last Health Maintenance visit information was reviewed, patient interviewed , no change since last HM visit yes  Last HM visit information was reviewed, patient interviewed and updates made to the record today yes    Assessment and Plan:  1  Annual physical exam        2   Overweight with body mass index (BMI) of 28 to 28 9 in adult            Health Maintenance Due   Topic Date Due   • Hepatitis B Vaccine (1 of 3 - 3-dose series) Never done   • COVID-19 Vaccine (1) Never done   • DTaP,Tdap,and Td Vaccines (1 - Tdap) Never done   • Influenza Vaccine (1) 09/01/2022   • BMI: Followup Plan  12/13/2022   • Annual Physical  12/13/2022

## 2023-08-21 ENCOUNTER — CLINICAL SUPPORT (OUTPATIENT)
Dept: FAMILY MEDICINE CLINIC | Facility: CLINIC | Age: 48
End: 2023-08-21
Payer: COMMERCIAL

## 2023-08-21 DIAGNOSIS — Z11.1 SCREENING FOR TUBERCULOSIS: Primary | ICD-10-CM

## 2023-08-21 PROCEDURE — 99211 OFF/OP EST MAY X REQ PHY/QHP: CPT

## 2023-08-21 PROCEDURE — 86580 TB INTRADERMAL TEST: CPT

## 2023-08-23 DIAGNOSIS — F41.9 ANXIETY: ICD-10-CM

## 2023-08-23 LAB
INDURATION: 0 MM
TB SKIN TEST: NEGATIVE

## 2023-08-23 NOTE — TELEPHONE ENCOUNTER
Patient requesting refill(s) of: Xanax #30x1    Last filled: 12/13/21 #30x1  Last appt: 12/19/22  Next appt: 1/4/24  Pharmacy: [facility]

## 2023-08-24 ENCOUNTER — TELEPHONE (OUTPATIENT)
Dept: FAMILY MEDICINE CLINIC | Facility: CLINIC | Age: 48
End: 2023-08-24

## 2023-08-24 RX ORDER — ALPRAZOLAM 0.5 MG/1
0.5 TABLET ORAL 2 TIMES DAILY PRN
Qty: 30 TABLET | Refills: 0 | Status: SHIPPED | OUTPATIENT
Start: 2023-08-24

## 2023-09-30 DIAGNOSIS — J45.20 MILD INTERMITTENT ASTHMA WITHOUT COMPLICATION: ICD-10-CM

## 2023-10-02 RX ORDER — ALBUTEROL SULFATE 90 UG/1
2 AEROSOL, METERED RESPIRATORY (INHALATION) EVERY 6 HOURS PRN
Qty: 18 G | Refills: 0 | Status: SHIPPED | OUTPATIENT
Start: 2023-10-02

## 2023-10-02 NOTE — TELEPHONE ENCOUNTER
Patient requesting refill(s) of: albuterol    Last filled: 12/31/2021  Last appt:12/19/2022  Next appt:1/4/2024  Pharmacy:    Patient requesting refill(s) of: allegra    Last filled:

## 2024-01-04 ENCOUNTER — OFFICE VISIT (OUTPATIENT)
Dept: FAMILY MEDICINE CLINIC | Facility: CLINIC | Age: 49
End: 2024-01-04
Payer: COMMERCIAL

## 2024-01-04 VITALS
DIASTOLIC BLOOD PRESSURE: 60 MMHG | RESPIRATION RATE: 18 BRPM | SYSTOLIC BLOOD PRESSURE: 116 MMHG | BODY MASS INDEX: 28.51 KG/M2 | WEIGHT: 167 LBS | HEART RATE: 59 BPM | TEMPERATURE: 97.5 F | HEIGHT: 64 IN | OXYGEN SATURATION: 99 %

## 2024-01-04 DIAGNOSIS — Z13.29 SCREENING FOR THYROID DISORDER: ICD-10-CM

## 2024-01-04 DIAGNOSIS — Z13.220 SCREENING CHOLESTEROL LEVEL: ICD-10-CM

## 2024-01-04 DIAGNOSIS — Z00.00 ANNUAL PHYSICAL EXAM: Primary | ICD-10-CM

## 2024-01-04 DIAGNOSIS — E55.9 VITAMIN D DEFICIENCY: ICD-10-CM

## 2024-01-04 PROCEDURE — 99396 PREV VISIT EST AGE 40-64: CPT | Performed by: NURSE PRACTITIONER

## 2024-01-04 NOTE — PROGRESS NOTES
Here for annual physical. Reviewed Albuterol and Alprazolam. Has taken 1 tablet of Alprazolam in the last 4 months    Reports some dizziness with bending over and with blowing nose        Gabbie Cook is a 48 y.o. female here for her yearly health maintenance exam.   There is no problem list on file for this patient.    Past Medical History:   Diagnosis Date    Acute closed-angle glaucoma     Anemia     Asthma          PHQ-2/9 Depression Screening    Little interest or pleasure in doing things: 0 - not at all  Feeling down, depressed, or hopeless: 0 - not at all  PHQ-2 Score: 0  PHQ-2 Interpretation: Negative depression screen           Current Outpatient Medications   Medication Sig Dispense Refill    albuterol (PROVENTIL HFA,VENTOLIN HFA) 90 mcg/act inhaler Inhale 2 puffs every 6 (six) hours as needed for wheezing 18 g 0    ALPRAZolam (XANAX) 0.5 mg tablet Take 1 tablet (0.5 mg total) by mouth 2 (two) times a day as needed for anxiety 30 tablet 0    Fexofenadine-Pseudoephedrine (ALLEGRA-D 24 HOUR PO) Take by mouth       No current facility-administered medications for this visit.     No Known Allergies  Immunization History   Administered Date(s) Administered    COVID-19 J&J (Tawanna) vaccine 0.5 mL 03/14/2021    INFLUENZA 12/13/2021    Influenza Injectable, MDCK, Preservative Free, Quadrivalent, 0.5 mL 11/10/2019    Influenza, injectable, quadrivalent, preservative free 0.5 mL 12/13/2021    Tuberculin Skin Test-PPD Intradermal 08/21/2023       Patient Care Team:  JULIÁN Fam as PCP - General (Family Medicine)  JULIÁN Fam as PCP - PCP-[facility] Attributed-Roster    Review of Systems   Constitutional:  Negative for activity change, diaphoresis, fatigue and fever.   HENT:  Negative for congestion, facial swelling, hearing loss, rhinorrhea, sinus pressure, sinus pain, sneezing, sore throat and voice change.    Eyes:  Negative for discharge and visual disturbance.   Respiratory:   Negative for cough, choking, chest tightness, shortness of breath, wheezing and stridor.    Cardiovascular:  Negative for chest pain, palpitations and leg swelling.   Gastrointestinal:  Negative for abdominal distention, abdominal pain, constipation, diarrhea, nausea and vomiting.   Endocrine: Negative for polydipsia, polyphagia and polyuria.   Genitourinary:  Negative for difficulty urinating, dysuria, frequency and urgency.   Musculoskeletal:  Negative for arthralgias, back pain, gait problem, joint swelling, myalgias, neck pain and neck stiffness.   Skin:  Negative for color change, rash and wound.   Neurological:  Positive for dizziness. Negative for syncope, speech difficulty, weakness, light-headedness and headaches.   Hematological:  Negative for adenopathy. Does not bruise/bleed easily.   Psychiatric/Behavioral:  Negative for agitation, behavioral problems, confusion, hallucinations, sleep disturbance and suicidal ideas. The patient is not nervous/anxious.          Physical Exam :  Physical Exam  Vitals and nursing note reviewed.   Constitutional:       General: She is not in acute distress.     Appearance: Normal appearance. She is well-developed. She is not diaphoretic.   HENT:      Head: Normocephalic and atraumatic.      Right Ear: Tympanic membrane, ear canal and external ear normal.      Left Ear: Tympanic membrane, ear canal and external ear normal.      Nose: Nose normal.      Right Sinus: No maxillary sinus tenderness or frontal sinus tenderness.      Left Sinus: No maxillary sinus tenderness or frontal sinus tenderness.      Mouth/Throat:      Mouth: Mucous membranes are moist.      Pharynx: Uvula midline. No oropharyngeal exudate.   Eyes:      General:         Right eye: No discharge.         Left eye: No discharge.      Conjunctiva/sclera: Conjunctivae normal.      Pupils: Pupils are equal, round, and reactive to light.   Neck:      Thyroid: No thyromegaly.      Trachea: No tracheal deviation.    Cardiovascular:      Rate and Rhythm: Normal rate and regular rhythm.      Heart sounds: Normal heart sounds. No murmur heard.     No friction rub. No gallop.   Pulmonary:      Effort: Pulmonary effort is normal. No respiratory distress.      Breath sounds: Normal breath sounds. No wheezing or rales.   Abdominal:      General: Bowel sounds are normal. There is no distension.      Palpations: Abdomen is soft. There is no mass.      Tenderness: There is no abdominal tenderness. There is no guarding or rebound.   Musculoskeletal:         General: No tenderness or deformity. Normal range of motion.      Cervical back: Normal range of motion and neck supple.      Right lower leg: No edema.      Left lower leg: No edema.   Lymphadenopathy:      Cervical: No cervical adenopathy.   Skin:     General: Skin is warm and dry.      Findings: No erythema or rash.   Neurological:      Mental Status: She is alert and oriented to person, place, and time.      Cranial Nerves: No cranial nerve deficit.      Coordination: Coordination normal.   Psychiatric:         Mood and Affect: Mood normal.         Speech: Speech normal.         Behavior: Behavior normal. Behavior is cooperative.         Thought Content: Thought content normal.         Judgment: Judgment normal.           Reviewed Updated [facility] Prior Wellness Visits:   Last Health Maintenance visit information was reviewed, patient interviewed , no change since last HM visit yes  Last  visit information was reviewed, patient interviewed and updates made to the record today yes    Assessment and Plan:  1. Annual physical exam  CBC and differential    Comprehensive metabolic panel      2. Vitamin D deficiency  Vitamin D 25 hydroxy      3. Screening cholesterol level  Lipid panel      4. Screening for thyroid disorder  TSH, 3rd generation with Free T4 reflex          Health Maintenance Due   Topic Date Due    Pneumococcal Vaccine: Pediatrics (0 to 5 Years) and At-Risk Patients  (6 to 64 Years) (1 - PCV) Never done    DTaP,Tdap,and Td Vaccines (1 - Tdap) Never done    Influenza Vaccine (1) 09/01/2023    COVID-19 Vaccine (2 - 2023-24 season) 09/01/2023    Annual Physical  12/19/2023

## 2024-01-13 ENCOUNTER — APPOINTMENT (OUTPATIENT)
Dept: LAB | Facility: CLINIC | Age: 49
End: 2024-01-13
Payer: COMMERCIAL

## 2024-01-13 DIAGNOSIS — E55.9 VITAMIN D DEFICIENCY: ICD-10-CM

## 2024-01-13 DIAGNOSIS — Z00.00 ANNUAL PHYSICAL EXAM: ICD-10-CM

## 2024-01-13 DIAGNOSIS — Z13.220 SCREENING CHOLESTEROL LEVEL: ICD-10-CM

## 2024-01-13 DIAGNOSIS — Z13.29 SCREENING FOR THYROID DISORDER: ICD-10-CM

## 2024-01-13 LAB
ALBUMIN SERPL BCP-MCNC: 4.4 G/DL (ref 3.5–5)
ALP SERPL-CCNC: 51 U/L (ref 34–104)
ALT SERPL W P-5'-P-CCNC: 11 U/L (ref 7–52)
ANION GAP SERPL CALCULATED.3IONS-SCNC: 4 MMOL/L
AST SERPL W P-5'-P-CCNC: 18 U/L (ref 13–39)
BASOPHILS # BLD AUTO: 0.06 THOUSANDS/ÂΜL (ref 0–0.1)
BASOPHILS NFR BLD AUTO: 1 % (ref 0–1)
BILIRUB SERPL-MCNC: 0.49 MG/DL (ref 0.2–1)
BUN SERPL-MCNC: 14 MG/DL (ref 5–25)
CALCIUM SERPL-MCNC: 10.1 MG/DL (ref 8.4–10.2)
CHLORIDE SERPL-SCNC: 103 MMOL/L (ref 96–108)
CHOLEST SERPL-MCNC: 177 MG/DL
CO2 SERPL-SCNC: 31 MMOL/L (ref 21–32)
CREAT SERPL-MCNC: 0.67 MG/DL (ref 0.6–1.3)
EOSINOPHIL # BLD AUTO: 0.22 THOUSAND/ÂΜL (ref 0–0.61)
EOSINOPHIL NFR BLD AUTO: 4 % (ref 0–6)
ERYTHROCYTE [DISTWIDTH] IN BLOOD BY AUTOMATED COUNT: 12.7 % (ref 11.6–15.1)
GFR SERPL CREATININE-BSD FRML MDRD: 103 ML/MIN/1.73SQ M
GLUCOSE P FAST SERPL-MCNC: 84 MG/DL (ref 65–99)
HCT VFR BLD AUTO: 44.2 % (ref 34.8–46.1)
HDLC SERPL-MCNC: 71 MG/DL
HGB BLD-MCNC: 14 G/DL (ref 11.5–15.4)
IMM GRANULOCYTES # BLD AUTO: 0.01 THOUSAND/UL (ref 0–0.2)
IMM GRANULOCYTES NFR BLD AUTO: 0 % (ref 0–2)
LDLC SERPL CALC-MCNC: 95 MG/DL (ref 0–100)
LYMPHOCYTES # BLD AUTO: 1.9 THOUSANDS/ÂΜL (ref 0.6–4.47)
LYMPHOCYTES NFR BLD AUTO: 31 % (ref 14–44)
MCH RBC QN AUTO: 29.6 PG (ref 26.8–34.3)
MCHC RBC AUTO-ENTMCNC: 31.7 G/DL (ref 31.4–37.4)
MCV RBC AUTO: 93 FL (ref 82–98)
MONOCYTES # BLD AUTO: 0.41 THOUSAND/ÂΜL (ref 0.17–1.22)
MONOCYTES NFR BLD AUTO: 7 % (ref 4–12)
NEUTROPHILS # BLD AUTO: 3.49 THOUSANDS/ÂΜL (ref 1.85–7.62)
NEUTS SEG NFR BLD AUTO: 57 % (ref 43–75)
NONHDLC SERPL-MCNC: 106 MG/DL
NRBC BLD AUTO-RTO: 0 /100 WBCS
PLATELET # BLD AUTO: 298 THOUSANDS/UL (ref 149–390)
PMV BLD AUTO: 10 FL (ref 8.9–12.7)
POTASSIUM SERPL-SCNC: 4.3 MMOL/L (ref 3.5–5.3)
PROT SERPL-MCNC: 7.5 G/DL (ref 6.4–8.4)
RBC # BLD AUTO: 4.73 MILLION/UL (ref 3.81–5.12)
SODIUM SERPL-SCNC: 138 MMOL/L (ref 135–147)
TRIGL SERPL-MCNC: 57 MG/DL
TSH SERPL DL<=0.05 MIU/L-ACNC: 1.51 UIU/ML (ref 0.45–4.5)
WBC # BLD AUTO: 6.09 THOUSAND/UL (ref 4.31–10.16)

## 2024-01-13 PROCEDURE — 84443 ASSAY THYROID STIM HORMONE: CPT

## 2024-01-13 PROCEDURE — 85025 COMPLETE CBC W/AUTO DIFF WBC: CPT

## 2024-01-13 PROCEDURE — 80061 LIPID PANEL: CPT

## 2024-01-13 PROCEDURE — 36415 COLL VENOUS BLD VENIPUNCTURE: CPT

## 2024-01-13 PROCEDURE — 80053 COMPREHEN METABOLIC PANEL: CPT

## 2024-01-13 PROCEDURE — 82306 VITAMIN D 25 HYDROXY: CPT

## 2024-01-14 LAB — 25(OH)D3 SERPL-MCNC: 27.3 NG/ML (ref 30–100)

## 2024-04-30 ENCOUNTER — TELEPHONE (OUTPATIENT)
Age: 49
End: 2024-04-30

## 2024-04-30 NOTE — TELEPHONE ENCOUNTER
Pt has on going leg pain is open to an apt but no availability base on her schedule. She has seen her PCP for his would like to know what her recommendation is at this time.

## 2024-04-30 NOTE — TELEPHONE ENCOUNTER
I called and spoke with patient. Reports she has had leg pain ongoing for years, feels it is more related to her sciatic nerve.     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     Scheduled for 6/10 at 4, and placed on cancellation list.

## 2024-04-30 NOTE — TELEPHONE ENCOUNTER
I would recommend a PT evaluation for sciatica- if she is agreeable- please give referral- they have evening hours. You can keep appointment for 6/10 if still needed when it gets closer

## 2024-05-03 NOTE — TELEPHONE ENCOUNTER
Attempt 3; called and left message for pt to call the office back.    Will done until we hear back

## 2024-06-06 ENCOUNTER — OFFICE VISIT (OUTPATIENT)
Dept: FAMILY MEDICINE CLINIC | Facility: CLINIC | Age: 49
End: 2024-06-06
Payer: COMMERCIAL

## 2024-06-06 VITALS
TEMPERATURE: 97.8 F | WEIGHT: 167 LBS | HEART RATE: 79 BPM | BODY MASS INDEX: 28.51 KG/M2 | RESPIRATION RATE: 18 BRPM | OXYGEN SATURATION: 99 % | SYSTOLIC BLOOD PRESSURE: 118 MMHG | HEIGHT: 64 IN | DIASTOLIC BLOOD PRESSURE: 68 MMHG

## 2024-06-06 DIAGNOSIS — G89.29 CHRONIC RIGHT-SIDED LOW BACK PAIN WITH RIGHT-SIDED SCIATICA: Primary | ICD-10-CM

## 2024-06-06 DIAGNOSIS — M54.41 CHRONIC RIGHT-SIDED LOW BACK PAIN WITH RIGHT-SIDED SCIATICA: Primary | ICD-10-CM

## 2024-06-06 PROCEDURE — 99213 OFFICE O/P EST LOW 20 MIN: CPT | Performed by: NURSE PRACTITIONER

## 2024-06-06 NOTE — PROGRESS NOTES
"Ambulatory Visit  Name: Gabbie Cook      : 1975      MRN: [id number]  Encounter Provider: JULIÁN Fam  Encounter Date: 2024   Encounter department: [facility] PRIMARY CARE    Assessment & Plan   1. Chronic right-sided low back pain with right-sided sciatica  -     Ambulatory referral to Spine & Pain Management; Future         History of Present Illness     Here for leg pain- woke up about 1 month ago and was unable to walk without limping- lasted 2 days. Got better with time but continues with some pain- wakes her up in the middle of the night. Reports pain in different areas- sometimes right buttocks, right shin, right knee- reports pain as a \"throbbing or dull\". After sitting for 10 minutes she has to get up and walk around due to the discomfort. To fall asleep when not at home she has to take Tylenol PM and a Xanax due to pain. She got a new very expensive bed with  some improvement.   At times unable to sit down due to pain \"I want to cry and just sit down\". Her  has a truck and she is unable to drive in it due to pain. \"I'm too young to be in pain all the time, it's not like I had a sports injury\".   Has been going to the chiropractor with minimal relief.     Leg Pain   Associated symptoms include numbness.     Review of Systems   Constitutional:  Negative for activity change, appetite change, fatigue and fever.   HENT:  Negative for congestion, sinus pressure and sore throat.    Eyes:  Negative for pain, discharge and visual disturbance.   Respiratory:  Negative for cough, chest tightness, shortness of breath and wheezing.    Cardiovascular:  Negative for chest pain, palpitations and leg swelling.   Gastrointestinal:  Negative for abdominal distention, abdominal pain, constipation, diarrhea and nausea.   Endocrine: Negative for polydipsia, polyphagia and polyuria.   Genitourinary:  Negative for difficulty urinating, dysuria, frequency and urgency.   Musculoskeletal: "  Positive for arthralgias, back pain and gait problem. Negative for joint swelling and neck stiffness.   Skin:  Negative for color change.   Neurological:  Positive for numbness. Negative for dizziness, syncope, speech difficulty, weakness and headaches.   Hematological:  Negative for adenopathy. Does not bruise/bleed easily.   Psychiatric/Behavioral:  Negative for agitation, behavioral problems, confusion and hallucinations. The patient is not nervous/anxious.      Past Medical History:   Diagnosis Date    Acute closed-angle glaucoma     Anemia     Asthma      Past Surgical History:   Procedure Laterality Date    EYE SURGERY      MASS EXCISION Right 11/10/2017    Procedure: OPEN EXPLORATION THIRD TOE POSSIBLE REMOVAL OF SOFT TISSUE LESION  FROZEN SECTION,INCISIONAL BONE BIOPSY BASE MIDDLE PHALANAX THIRD TOE;  Surgeon: Cortez Springer DPM;  Location: [facility];  Service: Podiatry    WISDOM TOOTH EXTRACTION       Family History   Problem Relation Age of Onset    Colon cancer Mother 83    Cancer Father     Colon cancer Maternal Grandfather 75     Social History     Tobacco Use    Smoking status: Former    Smokeless tobacco: Never    Tobacco comments:     quit 20 years ago   Vaping Use    Vaping status: Never Used   Substance and Sexual Activity    Alcohol use: Yes     Comment: about 8 to 10 oz weekly    Drug use: No    Sexual activity: Not on file     Current Outpatient Medications on File Prior to Visit   Medication Sig    albuterol (PROVENTIL HFA,VENTOLIN HFA) 90 mcg/act inhaler Inhale 2 puffs every 6 (six) hours as needed for wheezing    ALPRAZolam (XANAX) 0.5 mg tablet Take 1 tablet (0.5 mg total) by mouth 2 (two) times a day as needed for anxiety    Fexofenadine-Pseudoephedrine (ALLEGRA-D 24 HOUR PO) Take by mouth     No Known Allergies  Immunization History   Administered Date(s) Administered    COVID-19 J&J (Tawanna) vaccine 0.5 mL 03/14/2021    INFLUENZA 12/13/2021    Influenza Injectable, MDCK,  "Preservative Free, Quadrivalent, 0.5 mL 11/10/2019    Influenza, injectable, quadrivalent, preservative free 0.5 mL 12/13/2021    Tuberculin Skin Test-PPD Intradermal 08/21/2023     Objective     /68   Pulse 79   Temp 97.8 °F (36.6 °C)   Resp 18   Ht 5' 4\" (1.626 m)   Wt 75.8 kg (167 lb)   SpO2 99%   BMI 28.67 kg/m²     Physical Exam  Vitals and nursing note reviewed.   Constitutional:       General: She is not in acute distress.     Appearance: She is well-developed. She is not diaphoretic.   Pulmonary:      Effort: Pulmonary effort is normal. No respiratory distress.   Musculoskeletal:      Lumbar back: Tenderness (Right buttock down right leg to calf) present. No swelling, deformity or signs of trauma. Negative right straight leg raise test and negative left straight leg raise test.        Back:    Skin:     Coloration: Skin is not pale.   Neurological:      Mental Status: She is oriented to person, place, and time.   Psychiatric:         Mood and Affect: Mood and affect normal.         Speech: Speech normal.         Behavior: Behavior normal.         Thought Content: Thought content normal.         Cognition and Memory: Cognition and memory normal.         Judgment: Judgment normal.       Administrative Statements     "

## 2024-06-24 ENCOUNTER — APPOINTMENT (OUTPATIENT)
Dept: LAB | Facility: CLINIC | Age: 49
End: 2024-06-24
Payer: COMMERCIAL

## 2024-06-24 ENCOUNTER — OFFICE VISIT (OUTPATIENT)
Dept: FAMILY MEDICINE CLINIC | Facility: CLINIC | Age: 49
End: 2024-06-24
Payer: COMMERCIAL

## 2024-06-24 VITALS
HEIGHT: 64 IN | OXYGEN SATURATION: 96 % | BODY MASS INDEX: 28.46 KG/M2 | SYSTOLIC BLOOD PRESSURE: 94 MMHG | DIASTOLIC BLOOD PRESSURE: 52 MMHG | HEART RATE: 60 BPM | WEIGHT: 166.7 LBS | TEMPERATURE: 98.3 F | RESPIRATION RATE: 16 BRPM

## 2024-06-24 DIAGNOSIS — A69.20 ERYTHEMA MIGRANS (LYME DISEASE): Primary | ICD-10-CM

## 2024-06-24 DIAGNOSIS — R10.31 RLQ ABDOMINAL PAIN: ICD-10-CM

## 2024-06-24 DIAGNOSIS — M79.604 RIGHT LEG PAIN: ICD-10-CM

## 2024-06-24 DIAGNOSIS — A69.20 ERYTHEMA MIGRANS (LYME DISEASE): ICD-10-CM

## 2024-06-24 LAB
ALBUMIN SERPL BCG-MCNC: 4.1 G/DL (ref 3.5–5)
ALP SERPL-CCNC: 80 U/L (ref 34–104)
ALT SERPL W P-5'-P-CCNC: 55 U/L (ref 7–52)
ANION GAP SERPL CALCULATED.3IONS-SCNC: 10 MMOL/L (ref 4–13)
AST SERPL W P-5'-P-CCNC: 48 U/L (ref 13–39)
BACTERIA UR QL AUTO: ABNORMAL /HPF
BASOPHILS # BLD AUTO: 0.05 THOUSANDS/ÂΜL (ref 0–0.1)
BASOPHILS NFR BLD AUTO: 1 % (ref 0–1)
BILIRUB SERPL-MCNC: 0.33 MG/DL (ref 0.2–1)
BILIRUB UR QL STRIP: NEGATIVE
BUN SERPL-MCNC: 15 MG/DL (ref 5–25)
CALCIUM SERPL-MCNC: 9.4 MG/DL (ref 8.4–10.2)
CHLORIDE SERPL-SCNC: 102 MMOL/L (ref 96–108)
CLARITY UR: CLEAR
CO2 SERPL-SCNC: 27 MMOL/L (ref 21–32)
COLOR UR: ABNORMAL
CREAT SERPL-MCNC: 0.73 MG/DL (ref 0.6–1.3)
EOSINOPHIL # BLD AUTO: 0.27 THOUSAND/ÂΜL (ref 0–0.61)
EOSINOPHIL NFR BLD AUTO: 4 % (ref 0–6)
ERYTHROCYTE [DISTWIDTH] IN BLOOD BY AUTOMATED COUNT: 12.6 % (ref 11.6–15.1)
GFR SERPL CREATININE-BSD FRML MDRD: 96 ML/MIN/1.73SQ M
GLUCOSE P FAST SERPL-MCNC: 125 MG/DL (ref 65–99)
GLUCOSE UR STRIP-MCNC: NEGATIVE MG/DL
HCT VFR BLD AUTO: 40.9 % (ref 34.8–46.1)
HGB BLD-MCNC: 13.1 G/DL (ref 11.5–15.4)
HGB UR QL STRIP.AUTO: ABNORMAL
IMM GRANULOCYTES # BLD AUTO: 0.03 THOUSAND/UL (ref 0–0.2)
IMM GRANULOCYTES NFR BLD AUTO: 1 % (ref 0–2)
KETONES UR STRIP-MCNC: NEGATIVE MG/DL
LEUKOCYTE ESTERASE UR QL STRIP: NEGATIVE
LYMPHOCYTES # BLD AUTO: 1.84 THOUSANDS/ÂΜL (ref 0.6–4.47)
LYMPHOCYTES NFR BLD AUTO: 28 % (ref 14–44)
MCH RBC QN AUTO: 30.3 PG (ref 26.8–34.3)
MCHC RBC AUTO-ENTMCNC: 32 G/DL (ref 31.4–37.4)
MCV RBC AUTO: 95 FL (ref 82–98)
MONOCYTES # BLD AUTO: 0.47 THOUSAND/ÂΜL (ref 0.17–1.22)
MONOCYTES NFR BLD AUTO: 7 % (ref 4–12)
NEUTROPHILS # BLD AUTO: 3.94 THOUSANDS/ÂΜL (ref 1.85–7.62)
NEUTS SEG NFR BLD AUTO: 59 % (ref 43–75)
NITRITE UR QL STRIP: NEGATIVE
NON-SQ EPI CELLS URNS QL MICRO: ABNORMAL /HPF
NRBC BLD AUTO-RTO: 0 /100 WBCS
PH UR STRIP.AUTO: 6.5 [PH]
PLATELET # BLD AUTO: 299 THOUSANDS/UL (ref 149–390)
PMV BLD AUTO: 9.7 FL (ref 8.9–12.7)
POTASSIUM SERPL-SCNC: 4.1 MMOL/L (ref 3.5–5.3)
PROT SERPL-MCNC: 7.4 G/DL (ref 6.4–8.4)
PROT UR STRIP-MCNC: NEGATIVE MG/DL
RBC # BLD AUTO: 4.33 MILLION/UL (ref 3.81–5.12)
RBC #/AREA URNS AUTO: ABNORMAL /HPF
SODIUM SERPL-SCNC: 139 MMOL/L (ref 135–147)
SP GR UR STRIP.AUTO: 1.02 (ref 1–1.03)
UROBILINOGEN UR STRIP-ACNC: <2 MG/DL
WBC # BLD AUTO: 6.6 THOUSAND/UL (ref 4.31–10.16)
WBC #/AREA URNS AUTO: ABNORMAL /HPF

## 2024-06-24 PROCEDURE — 80053 COMPREHEN METABOLIC PANEL: CPT

## 2024-06-24 PROCEDURE — 99213 OFFICE O/P EST LOW 20 MIN: CPT | Performed by: NURSE PRACTITIONER

## 2024-06-24 PROCEDURE — 86617 LYME DISEASE ANTIBODY: CPT

## 2024-06-24 PROCEDURE — 85025 COMPLETE CBC W/AUTO DIFF WBC: CPT

## 2024-06-24 PROCEDURE — 36415 COLL VENOUS BLD VENIPUNCTURE: CPT

## 2024-06-24 PROCEDURE — 87086 URINE CULTURE/COLONY COUNT: CPT

## 2024-06-24 PROCEDURE — 81001 URINALYSIS AUTO W/SCOPE: CPT

## 2024-06-24 PROCEDURE — 86618 LYME DISEASE ANTIBODY: CPT

## 2024-06-24 NOTE — PROGRESS NOTES
"Ambulatory Visit  Name: Gabbie Cook      : 1975      MRN: [id number]  Encounter Provider: JULIÁN Fam  Encounter Date: 2024   Encounter department: [facility] PRIMARY CARE    Assessment & Plan   1. Erythema migrans (Lyme disease)  -     Lyme Total AB W Reflex to IGM/IGG; Future  -     CBC and differential; Future  -     Comprehensive metabolic panel; Future  2. Right leg pain  -     Lyme Total AB W Reflex to IGM/IGG; Future  -     CBC and differential; Future  -     Comprehensive metabolic panel; Future  3. RLQ abdominal pain  -     Urinalysis with microscopic; Future  -     Urine culture; Future         History of Present Illness     Started 2 weeks ago with a \"weird bite\" on her right lower leg. (Circular red rash on lower leg- has pictures on her phone) Was taking Tylenol PM for her chronic right leg- tried THC cream for the pain on her right leg. Skin of right hip was irritated \"I just wanted to rip my clothing off\". Started then with pain RLQ radiating into the front. Thought she was constipated so she took a laxative and has had a few bowel movements since then. Pain is severe in low back and radiates into right groin.   Denies fever, bodyaches, headaches. About 5 days ago she \"woke up and couldn't function\" due to the pain. Has been alternating tylenol/Advil round the clock since Friday morning. Took Advil in middle of night last night, took Tylenol at 6am, Advil at 11:45am- right now feeling \"awesome\" because she has been       Review of Systems   Constitutional:  Negative for activity change, diaphoresis, fatigue and fever.   HENT:  Negative for congestion, facial swelling, hearing loss, rhinorrhea, sinus pressure, sinus pain, sneezing, sore throat and voice change.    Eyes:  Negative for discharge and visual disturbance.   Respiratory:  Negative for cough, choking, chest tightness, shortness of breath, wheezing and stridor.    Cardiovascular:  Negative for chest pain, " palpitations and leg swelling.   Gastrointestinal:  Positive for abdominal pain and constipation. Negative for abdominal distention, diarrhea, nausea and vomiting.   Endocrine: Negative for polydipsia, polyphagia and polyuria.   Genitourinary:  Positive for flank pain. Negative for difficulty urinating, dysuria, frequency and urgency.   Musculoskeletal:  Positive for arthralgias, back pain, gait problem and myalgias. Negative for joint swelling, neck pain and neck stiffness.   Skin:  Positive for rash. Negative for color change and wound.   Neurological:  Negative for dizziness, syncope, speech difficulty, weakness, light-headedness and headaches.   Hematological:  Negative for adenopathy. Does not bruise/bleed easily.   Psychiatric/Behavioral:  Negative for agitation, behavioral problems, confusion, hallucinations, sleep disturbance and suicidal ideas. The patient is not nervous/anxious.      Past Medical History:   Diagnosis Date    Acute closed-angle glaucoma     Anemia     Asthma      Past Surgical History:   Procedure Laterality Date    EYE SURGERY      MASS EXCISION Right 11/10/2017    Procedure: OPEN EXPLORATION THIRD TOE POSSIBLE REMOVAL OF SOFT TISSUE LESION  FROZEN SECTION,INCISIONAL BONE BIOPSY BASE MIDDLE PHALANAX THIRD TOE;  Surgeon: Cortez Springer DPM;  Location: [facility] OR;  Service: Podiatry    WISDOM TOOTH EXTRACTION       Family History   Problem Relation Age of Onset    Colon cancer Mother 83    Cancer Father     Colon cancer Maternal Grandfather 75     Social History     Tobacco Use    Smoking status: Former    Smokeless tobacco: Never    Tobacco comments:     quit 20 years ago   Vaping Use    Vaping status: Never Used   Substance and Sexual Activity    Alcohol use: Yes     Comment: about 8 to 10 oz weekly    Drug use: No    Sexual activity: Not on file     Current Outpatient Medications on File Prior to Visit   Medication Sig    albuterol (PROVENTIL HFA,VENTOLIN HFA) 90 mcg/act inhaler  "Inhale 2 puffs every 6 (six) hours as needed for wheezing    ALPRAZolam (XANAX) 0.5 mg tablet Take 1 tablet (0.5 mg total) by mouth 2 (two) times a day as needed for anxiety    Fexofenadine-Pseudoephedrine (ALLEGRA-D 24 HOUR PO) Take by mouth     No Known Allergies  Immunization History   Administered Date(s) Administered    COVID-19 J&J ([place]) vaccine 0.5 mL 03/14/2021    INFLUENZA 12/13/2021    Influenza Injectable, MDCK, Preservative Free, Quadrivalent, 0.5 mL 11/10/2019    Influenza, injectable, quadrivalent, preservative free 0.5 mL 12/13/2021    Tuberculin Skin Test-PPD Intradermal 08/21/2023     Objective     BP 94/52   Pulse 60   Temp 98.3 °F (36.8 °C) (Temporal)   Resp 16   Ht 5' 4\" (1.626 m)   Wt 75.6 kg (166 lb 11.2 oz)   SpO2 96%   BMI 28.61 kg/m²     Physical Exam  Vitals and nursing note reviewed.   Constitutional:       General: She is not in acute distress.     Appearance: She is well-developed. She is not diaphoretic.   Neck:      Trachea: No tracheal deviation.   Cardiovascular:      Rate and Rhythm: Normal rate and regular rhythm.      Heart sounds: Normal heart sounds. No murmur heard.  Pulmonary:      Effort: Pulmonary effort is normal. No respiratory distress.      Breath sounds: Normal breath sounds. No wheezing.   Abdominal:      General: Abdomen is flat. Bowel sounds are normal. There is no distension.      Tenderness: There is no abdominal tenderness. There is no right CVA tenderness, left CVA tenderness, guarding or rebound.   Musculoskeletal:         General: No tenderness or deformity. Normal range of motion.   Skin:     General: Skin is warm and dry.      Findings: Erythema (circular faint rash on Right lower leg) and rash present.   Neurological:      Mental Status: She is alert and oriented to person, place, and time.   Psychiatric:         Mood and Affect: Mood normal.         Behavior: Behavior normal. Behavior is cooperative.         Thought Content: Thought content " normal.         Judgment: Judgment normal.       Administrative Statements

## 2024-06-25 DIAGNOSIS — A69.20 LYME DISEASE: Primary | ICD-10-CM

## 2024-06-25 LAB
B BURGDOR IGG SERPL QL IA: POSITIVE
B BURGDOR IGG+IGM SER QL IA: POSITIVE
B BURGDOR IGM SERPL QL IA: POSITIVE
BACTERIA UR CULT: NORMAL

## 2024-06-25 RX ORDER — DOXYCYCLINE 100 MG/1
100 CAPSULE ORAL 2 TIMES DAILY
Qty: 42 CAPSULE | Refills: 0 | Status: SHIPPED | OUTPATIENT
Start: 2024-06-25 | End: 2024-07-16

## 2024-06-26 ENCOUNTER — TELEPHONE (OUTPATIENT)
Dept: PAIN MEDICINE | Facility: CLINIC | Age: 49
End: 2024-06-26

## 2024-06-26 ENCOUNTER — CONSULT (OUTPATIENT)
Dept: PAIN MEDICINE | Facility: CLINIC | Age: 49
End: 2024-06-26
Payer: COMMERCIAL

## 2024-06-26 ENCOUNTER — APPOINTMENT (OUTPATIENT)
Dept: RADIOLOGY | Facility: MEDICAL CENTER | Age: 49
End: 2024-06-26
Payer: COMMERCIAL

## 2024-06-26 VITALS
SYSTOLIC BLOOD PRESSURE: 122 MMHG | HEIGHT: 64 IN | RESPIRATION RATE: 16 BRPM | BODY MASS INDEX: 28.65 KG/M2 | WEIGHT: 167.8 LBS | HEART RATE: 59 BPM | DIASTOLIC BLOOD PRESSURE: 80 MMHG

## 2024-06-26 DIAGNOSIS — M79.18 MYOFASCIAL PAIN SYNDROME: ICD-10-CM

## 2024-06-26 DIAGNOSIS — G89.4 CHRONIC PAIN SYNDROME: ICD-10-CM

## 2024-06-26 DIAGNOSIS — M47.816 LUMBAR SPONDYLOSIS: ICD-10-CM

## 2024-06-26 DIAGNOSIS — M51.16 LUMBAR DISC DISEASE WITH RADICULOPATHY: ICD-10-CM

## 2024-06-26 DIAGNOSIS — M51.16 LUMBAR DISC DISEASE WITH RADICULOPATHY: Primary | ICD-10-CM

## 2024-06-26 PROCEDURE — 72114 X-RAY EXAM L-S SPINE BENDING: CPT

## 2024-06-26 PROCEDURE — 99204 OFFICE O/P NEW MOD 45 MIN: CPT | Performed by: ANESTHESIOLOGY

## 2024-06-26 NOTE — PROGRESS NOTES
Assessment:  1. Lumbar disc disease with radiculopathy    2. Lumbar spondylosis    3. Myofascial pain syndrome    4. Chronic pain syndrome        Plan:  Patient is a 49-year-old female complains of low back pain right leg pain with chronic pain syndrome secondary to lumbar degenerative disease, lumbar radiculopathy, lumbar spondylosis presents to office for initial consultation.  Patient reports difficulty sleeping at nighttime often having some tingling sensation to the legs, difficulty standing for long periods of time and doing repetitive and strenuous chores secondary to low back pain and radiating pain into lower extremity.  Patient describes the pain as doses aching, throbbing, shooting pain which appears to be in the L5 nerve root distribution.  At this time we will exhaust conservative therapy.  1.  We will provide patient with physical therapy lumbar core strengthening  2.  Will obtain x-ray of the lumbar spine flexion-extension to assess for any degenerative changes or correlate patient current presentation.  3.  We will provide patient with diclofenac 50 mg p.o. twice daily  4.  Follow-up in 6 weeks  5.  After physical therapy is completed we will obtain an MRI of the lumbar spine regardless of whether the patient is feeling better      History of Present Illness:    The patient is a 49 y.o. female who presents for consultation in regards to Back Pain.  Symptoms have been present for 4 years. Symptoms began without any precipitating injury or trauma. Pain is reported to be 7 on the numeric rating scale.  Symptoms are felt intermittently and worst in the nighttime.  Symptoms are characterized as dull/aching and throbbing.  Symptoms are associated with right leg weakness.  Aggravating factors include lying down, bending, leaning forward, leaning bckward, and sitting.  Relieving factors include standing and walking.  No change in symptoms with turning the head, relaxation, coughing/sneezing, and bowel  "movements.  Treatments that have been helpful include physical therapy, chiropractic manipulation, home exercise, and heat/ice. Medications to relieve symptoms include ibuprofen, Tylenol.    Review of Systems:    Review of Systems   Musculoskeletal:  Positive for back pain and gait problem.   All other systems reviewed and are negative.          Past Medical History:   Diagnosis Date    Acute closed-angle glaucoma     Anemia     Asthma        Past Surgical History:   Procedure Laterality Date    EYE SURGERY      MASS EXCISION Right 11/10/2017    Procedure: OPEN EXPLORATION THIRD TOE POSSIBLE REMOVAL OF SOFT TISSUE LESION  FROZEN SECTION,INCISIONAL BONE BIOPSY BASE MIDDLE PHALANAX THIRD TOE;  Surgeon: Cortez Springer DPM;  Location: [facility] OR;  Service: Podiatry    WISDOM TOOTH EXTRACTION         Family History   Problem Relation Age of Onset    Colon cancer Mother 83    Cancer Father     Colon cancer Maternal Grandfather 75       Social History     Occupational History    Not on file   Tobacco Use    Smoking status: Former    Smokeless tobacco: Never    Tobacco comments:     quit 20 years ago   Vaping Use    Vaping status: Never Used   Substance and Sexual Activity    Alcohol use: Yes     Comment: about 8 to 10 oz weekly    Drug use: No    Sexual activity: Not on file         Current Outpatient Medications:     albuterol (PROVENTIL HFA,VENTOLIN HFA) 90 mcg/act inhaler, Inhale 2 puffs every 6 (six) hours as needed for wheezing, Disp: 18 g, Rfl: 0    ALPRAZolam (XANAX) 0.5 mg tablet, Take 1 tablet (0.5 mg total) by mouth 2 (two) times a day as needed for anxiety, Disp: 30 tablet, Rfl: 0    doxycycline monohydrate (MONODOX) 100 mg capsule, Take 1 capsule (100 mg total) by mouth 2 (two) times a day for 21 days, Disp: 42 capsule, Rfl: 0    Fexofenadine-Pseudoephedrine (ALLEGRA-D 24 HOUR PO), Take by mouth, Disp: , Rfl:     No Known Allergies    Physical Exam:    /80   Pulse 59   Resp 16   Ht 5' 4\" (1.626 m) "   Wt 76.1 kg (167 lb 12.8 oz)   BMI 28.80 kg/m²     Constitutional: normal, well developed, well nourished, alert, in no distress and non-toxic and no overt pain behavior.  Eyes: anicteric  HEENT: grossly intact  Neck: supple, symmetric, trachea midline and no masses   Pulmonary:even and unlabored  Cardiovascular:No edema or pitting edema present  Skin:Normal without rashes or lesions and well hydrated  Psychiatric:Mood and affect appropriate  Neurologic:Cranial Nerves II-XII grossly intact  Musculoskeletal:antalgic    Lumbar/Sacral Spine examination demonstrates.  Full range of motion lumbar spine with pain upon: flexion, lateral rotation to the left/right, and bending to the left/right.  Bilateral lumbar paraspinals tender to palpation. Muscle spasms noted in the lumbar area bilaterally. 5/5 lower extremity strength in all muscle groups bilaterally. Positive seated straight leg raise for bilateral lower extremities.  Sensitivity to light touch intact bilateral lower extremities. 2+ reflexes in the patella and Achilles.  No ankle clonus    Imaging      Study Result    Narrative & Impression   LUMBAR SPINE     INDICATION:   M54.16: Radiculopathy, lumbar region.     COMPARISON:  None     VIEWS:  XR SPINE LUMBAR 2 OR 3 VIEWS INJURY        FINDINGS:     There are 5 non rib bearing lumbar vertebral bodies.      There is no evidence of acute fracture or destructive osseous lesion.     Alignment is unremarkable.      L1 small osteophyte.  L2-L3 through L4-L5 mild degenerative disc changes.     The pedicles appear intact.     Soft tissues are unremarkable.     IMPRESSION:     No acute osseous abnormality        Workstation performed: [id number]              No orders to display       No orders of the defined types were placed in this encounter.

## 2024-06-26 NOTE — TELEPHONE ENCOUNTER
Caller: Andria     Doctor: Gila    Reason for call: stuck in traffic calling to give you heads up    Call back#: [phone]

## 2024-06-26 NOTE — PATIENT INSTRUCTIONS
Patient Education     Core Strengthening Exercises on Stomach or Side   About this topic   Your core muscles are in your chest, back, buttock, and stomach area. They are your abdominal, back, and pelvis muscles. These muscles help keep your body stable when using your arms or legs. They also help with balance and posture. There are many exercises you can do to keep these muscles strong.  If you have back problems like a compression fracture or a ruptured disc, doing some of these exercises could make your problem worse. Some of these exercises may cause lower back pain.  General   Before starting with a program, ask your doctor if you are healthy enough to do these exercises. Your doctor may have you work with a , chiropractor, or physical therapist to make a safe exercise program to meet your needs.  Strengthening Exercises   Strengthening exercises keep your muscles firm and strong. Start by repeating each exercise 2 to 3 times. Work up to doing each exercise 10 times. Try to do the exercises 2 to 3 times each day. Do all exercises slowly.  Leg lifts on stomach ? Lie on your stomach. Keeping the knee straight, lift one leg towards the ceiling. Try to keep your back straight. Hold for 3 to 5 seconds. Then, lower leg back to the ground. Repeat with the other leg.  Upper body lifts on stomach ? Lie on your stomach. Extend your arms over your head so your elbows are by your ears. Keep your head aligned with your back and lift your upper body off the floor and hold 3 to 5 seconds. Then, lower back to the ground. If this is too hard, start by lifting one arm at a time off the ground. Hold the arm up, then lower back to the ground. Breathe out when you lift up. Breathe in when you lower yourself down.  Alternate opposite arm and leg lifts on stomach ? Lie on your stomach. Extend your arms over your head so your elbows are by your ears. Keep your head aligned with your back and lift one arm and the opposite leg at  the same time and hold 3 to 5 seconds. Then, return to the start position. Repeat with the other arm and leg.  Arm and leg lifts on stomach also called Superman exercise ? Lie on your stomach. Extend your arms over your head so your elbows are by your ears. Lift your upper body and legs up off the floor at the same time and hold 3 to 5 seconds. Lower to the ground. This is a very hard exercise. It may take some time doing the other exercises before you are strong enough to do this one.  Planks on stomach ? Lie on your stomach with your upper body propped up on your elbows. Make sure your shoulders are above your elbows in a straight line. Lift your hips off the ground until your spine is lined up with your head and legs, keeping your body as straight as possible. Your lower arm and toes should be the only parts touching the ground. Hold for up to a minute if you are able to. As you get stronger, hold this position longer.  Planks on side ? Lie on your side. Bend your upper body so you are propped up on your bottom elbow. Make sure your shoulder is above your elbow in a straight line. Now lift your hips up so your body is lined up straight with your head and feet. Your bottom lower arm and foot should be the only parts touching the ground. Hold for 30 seconds if you are able to. Then, switch sides. As you get stronger, hold this position longer.               What will the results be?   Stronger core  Better balance  More toned belly and back muscles  Easier to do daily activities  Better athletic performance  Helpful tips   Stay active and work out to keep your muscles strong and flexible.  Keep a healthy weight to avoid putting too much stress on your spine. Eat a healthy diet to keep your muscles healthy.  Be sure you do not hold your breath when exercising. This can raise your blood pressure. If you tend to hold your breath, try counting out loud when exercising. If any exercise bothers you, stop right away.  Try  walking or cycling at an easy pace for a few minutes to warm up your muscles. Do this again after exercising.  Exercise may be slightly uncomfortable, but you should not have sharp pains. If you do get sharp pains, stop what you are doing. If the sharp pains continue, call your doctor.  Last Reviewed Date   2021-03-18  Consumer Information Use and Disclaimer   This generalized information is a limited summary of diagnosis, treatment, and/or medication information. It is not meant to be comprehensive and should be used as a tool to help the user understand and/or assess potential diagnostic and treatment options. It does NOT include all information about conditions, treatments, medications, side effects, or risks that may apply to a specific patient. It is not intended to be medical advice or a substitute for the medical advice, diagnosis, or treatment of a health care provider based on the health care provider's examination and assessment of a patient’s specific and unique circumstances. Patients must speak with a health care provider for complete information about their health, medical questions, and treatment options, including any risks or benefits regarding use of medications. This information does not endorse any treatments or medications as safe, effective, or approved for treating a specific patient. [employer] and its affiliates disclaim any warranty or liability relating to this information or the use thereof. The use of this information is governed by the Terms of Use, available at https://www.[employer].com/en/know/clinical-effectiveness-terms   Copyright   Copyright © 2024 [employer] and its affiliates and/or licensors. All rights reserved.

## 2024-07-03 NOTE — PROGRESS NOTES
PT Evaluation     Today's date: 2024  Patient name: Gabbie Cook  : 1975  MRN: [id number]  Referring provider: Janay Garza MD  Dx:   Encounter Diagnosis     ICD-10-CM    1. Lumbar disc disease with radiculopathy  M51.16 Ambulatory referral to Physical Therapy      2. Lumbar spondylosis  M47.816 Ambulatory referral to Physical Therapy      3. Chronic pain syndrome  G89.4 Ambulatory referral to Physical Therapy      4. Myofascial pain syndrome  M79.18 Ambulatory referral to Physical Therapy                     Assessment  Impairments: abnormal or restricted ROM, activity intolerance, impaired balance, impaired physical strength, lacks appropriate home exercise program, pain with function and poor posture   Symptom irritability: moderate    Assessment details: Gabbie Cook is a 49 y.o. female with a history of acute glaucoma, anemia, asthma that presents for a moderate complexity physical therapy initial evaluation.  The patient demonstrates signs and symptoms consistent with lumbar DDD with radiculopathy, chronic pain syndrome, myofascial pain syndrome, lumbar spondylosis.  During the examination the patient demonstrated decreased core/LE strength, decreased lumbar ROM, activity intolerance, and L/B and R LE pain.  The patient's impairments are causing the following functional limitations: difficulty with prolonged sitting, difficulty bending/stooping, difficulty lifting/carrying heavy objects, difficulty walking on unlevel surfaces, difficulty squatting/kneeling, and difficulty sleeping/lying.  The patient's clinical presentation is evolving due to a number of participation restrictions, significant medial history, and functional limitation (FOTO 60% function).  The patient will benefit from skilled PT services to address impairments, work towards goals, and restore PLOF.      Understanding of Dx/Px/POC: good     Prognosis: good  Prognosis details: Positive prognostic indicators  "include: positive attitude toward recovery, good understanding of diagnosis/treatment plan, and absence of observed red flags.      Negative prognostic indicators include: Significant medical Hx, chronicity of condition    Goals  STG: Achieve in 4-6 weeks  1.  Decrease lumbar pain at worst by 50% to improve activity tolerance for self/care and leisure activities.  2.  Improve LE/core strength by 1/2 muscle grade to improve ability to change body positions without difficulty.  3.  Improve lumbar ROM to \" minimal restriction\" to facilitate normal movement patterns for ADL's/work tasks.    LTG: Achieve in 8-12 weeks  1.  Patient's FOTO score improve to > 70% to indicate a return to normal functioning.  2.  Patient tolerate sleeping without needing medication and dit for 15 minutes without pain to achieve patient specific goal.  3.  Patient achieve independence with performing home exercise plan.         Plan  Patient would benefit from: skilled physical therapy  Planned modality interventions: cryotherapy, thermotherapy: hydrocollator packs and traction    Planned therapy interventions: joint mobilization, manual therapy, neuromuscular re-education, patient education, postural training, self care, strengthening, stretching, therapeutic activities, therapeutic exercise, home exercise program, abdominal trunk stabilization, balance, body mechanics training and IASTM    Frequency: 1-3x/wk.  Duration in weeks: 12  Plan of Care beginning date: 7/8/2024  Plan of Care expiration date: 10/7/2024  Treatment plan discussed with: PTA and patient  Plan details: RE-ASSESS 1X/MONTH  Patient requests treatment 1x/week        Subjective Evaluation    History of Present Illness  Mechanism of injury: Gabbie Cook is a 49 y.o. female that presents to outpatient physical therapy with complaints of chronic low back pain, R leg pain and difficulty functioning.  Onset of LBP 20+ years prior after being pregnant with her son.  The " "patient notes intermittent LBP flare ups since.The patient reports difficulty sleeping due to the pain, difficulty with prolonged standing, and intermittent LE tingling.  The patient has pain/difficulty with heavy activities / work.  Difficulty with picking weeds in spring, difficulty lifting weights, and difficulty with sitting prolonged time periods, and difficulty sleeping.  The patient notes difficulty with getting OOB with 1 episode of difficulty walking in the AM.  The patient notes R lower lateral leg \"ache\" which wakes the patient up during the night.  The patient was seen by spine and pain who referred the patient to outpatient PT.  The patient's main goal for physical therapy is to be able to sit for 15 mins or longer and  to seep through the whole night without medication..     Addition to pmHx: Recent diagnosis of Lyme's disease          Recurrent probem    Patient Goals  Patient goals for therapy: decreased pain, increased motion, increased strength, independence with ADLs/IADLs and return to sport/leisure activities  Patient goal: be able to sit for 15 mins or longer and  to seep through the whole night without medication  Pain  Location: R sided low back pain; R hip to foot radiation  Quality: dull ache  Relieving factors: ice and heat  Aggravating factors: sitting and lifting (sleeping)  Progression: worsening    Social Support  Steps to enter house: yes  Stairs in house: yes   Lives in: multiple-level home    Employment status: working (teacher)  Treatments  Current treatment: physical therapy        Objective     Concurrent Complaints  Positive for night pain (4x/week) and disturbed sleep (4x/week). Negative for bladder dysfunction, bowel dysfunction, saddle (S4) numbness, history of cancer, history of trauma and infection (recent Lyme's diagnosis - on meds 7 days)    Additional Special Questions  The patient was given sleep position education information    Static Posture     Shoulders  Asymmetric " shoulders.    Scapulae  Right depressed and left elevated.    Lumbar Spine   Decreased lordosis.   Lumbar spine (Right): Shifted.      Postural Observations  Seated posture: poor  Standing posture: poor  Correction of posture: makes symptoms better      Palpation     Additional Palpation Details  NO TTP    Tenderness     Additional Tenderness Details  NO TTP    Neurological Testing     Sensation     Lumbar   Left   Intact: light touch    Right   Intact: light touch    Reflexes   Left   Patellar (L4): normal (2+)  Achilles (S1): normal (2+)  Babinski sign: negative    Right   Patellar (L4): normal (2+)  Achilles (S1): normal (2+)  Babinski sign: negative    Active Range of Motion     Lumbar   Flexion:  WFL  Extension:  Restriction level: moderate  Left lateral flexion:  WFL  Right lateral flexion:  Restriction level: minimal  Left rotation:  Restriction level: minimal  Right rotation:  Restriction level: minimal  Mechanical Assessment    Cervical      Thoracic      Lumbar    Standing extension: repeated movements  Pain location: no change  Lying extension: repeated movements  Pain location: no change    Strength/Myotome Testing     Left Hip   Planes of Motion   Flexion: 4  Extension: 4-  Abduction: 4+  Adduction: 4+    Right Hip   Planes of Motion   Flexion: 3+  Extension: 3+  Abduction: 4-  Adduction: 4    Left Knee   Flexion: 4  Extension: 4    Right Knee   Flexion: 4-  Extension: 4-    Left Ankle/Foot   Dorsiflexion: 5  Plantar flexion: 5  Great toe extension: 5    Right Ankle/Foot   Dorsiflexion: 5  Plantar flexion: 5  Great toe extension: 5    Muscle Activation     Additional Muscle Activation Details  Decreased TrA, multifidus, gluteal activation with transfers / position changes / dynamic movement      Tests     Lumbar   Negative SIJ compression and sacral spring .     Left   Negative passive SLR and slump test.     Right   Negative passive SLR and slump test.     Left Pelvic Girdle/Sacrum   Negative: active  SLR test.     Right Pelvic Girdle/Sacrum   Negative: active SLR test.     Left Hip   Negative FADIR.     Right Hip   Negative FADIR.     Additional Tests Details  FOTO: 60% ( predicted 69%)    Graphical documentation:                  Precautions: chronic pain/asthma  RE-EVAL:8/5  [id number]   [phone]   Specialty Daily Treatment Diary     Manual  7/8       Lumbar P/A *               Manual nerve glides        Piriformis Stretch B/L        Prone Quad stretch            Therapeutic Exercise 7/8       Treadmill Ambulation for endurance        UBE Stand ALT FWD/BACK for core/posture strength *                               Bridges *               Prone press ups x10       Standing back extensions x10       Hip sags                        Stand Hip ABD+EXT *               Neuro Re-ed        Core brace *       kegels *       Multifidi *       Glute sets *       Quad DLS UE  LE  UE+LE *       Quad knee lifts        Clam shells        MTP/LTP/ Anti-rotations        Towel roll education, Posture correction; movement precautions DONE and added sleep position education       Lift/chop T-band        SLB *       Posture corrections seated *               Sidestepping        Heel toe Amb *                       Therapeutic Activity        Steps ups fwd/side        Crate carry        Crate lifts 3 levels        Lunges        Chair squats            Modalities        MHP/CP to L/B                               The patient was given a new home exercise plan with instruction, pictures, and verbal feedback.  The patient accepts and understands the new home activities.

## 2024-07-08 ENCOUNTER — EVALUATION (OUTPATIENT)
Dept: PHYSICAL THERAPY | Facility: CLINIC | Age: 49
End: 2024-07-08
Payer: COMMERCIAL

## 2024-07-08 DIAGNOSIS — M47.816 LUMBAR SPONDYLOSIS: ICD-10-CM

## 2024-07-08 DIAGNOSIS — G89.4 CHRONIC PAIN SYNDROME: ICD-10-CM

## 2024-07-08 DIAGNOSIS — M79.18 MYOFASCIAL PAIN SYNDROME: ICD-10-CM

## 2024-07-08 DIAGNOSIS — M51.16 LUMBAR DISC DISEASE WITH RADICULOPATHY: Primary | ICD-10-CM

## 2024-07-08 PROCEDURE — 97110 THERAPEUTIC EXERCISES: CPT | Performed by: PHYSICAL THERAPIST

## 2024-07-08 PROCEDURE — 97162 PT EVAL MOD COMPLEX 30 MIN: CPT | Performed by: PHYSICAL THERAPIST

## 2024-07-08 PROCEDURE — 97112 NEUROMUSCULAR REEDUCATION: CPT | Performed by: PHYSICAL THERAPIST

## 2024-07-17 ENCOUNTER — OFFICE VISIT (OUTPATIENT)
Dept: PHYSICAL THERAPY | Facility: CLINIC | Age: 49
End: 2024-07-17
Payer: COMMERCIAL

## 2024-07-17 DIAGNOSIS — M47.816 LUMBAR SPONDYLOSIS: ICD-10-CM

## 2024-07-17 DIAGNOSIS — G89.4 CHRONIC PAIN SYNDROME: ICD-10-CM

## 2024-07-17 DIAGNOSIS — M79.18 MYOFASCIAL PAIN SYNDROME: ICD-10-CM

## 2024-07-17 DIAGNOSIS — M51.16 LUMBAR DISC DISEASE WITH RADICULOPATHY: Primary | ICD-10-CM

## 2024-07-17 PROCEDURE — 97112 NEUROMUSCULAR REEDUCATION: CPT | Performed by: PHYSICAL THERAPIST

## 2024-07-17 PROCEDURE — 97110 THERAPEUTIC EXERCISES: CPT | Performed by: PHYSICAL THERAPIST

## 2024-07-17 PROCEDURE — 97140 MANUAL THERAPY 1/> REGIONS: CPT | Performed by: PHYSICAL THERAPIST

## 2024-07-17 NOTE — PROGRESS NOTES
Daily Note     Today's date: 2024  Patient name: Gabbie Cook  : 1975  MRN: [id number]  Referring provider: Janay Garza MD  Dx:   Encounter Diagnosis     ICD-10-CM    1. Lumbar disc disease with radiculopathy  M51.16       2. Lumbar spondylosis  M47.816       3. Chronic pain syndrome  G89.4       4. Myofascial pain syndrome  M79.18                      Subjective: The patient notes she has been ill the past week with 2 days being unable to get up OOB so she hasn't been exercising diligently to say the least.  The patient does feel better today and she denies any pain to start the session.      Objective: See treatment diary below      Assessment: The patient tolerated the treatment well today with mild complaints of discomfort at the lumbar spine most notable with anterior pelvic tilts.  The patient was given VC to correct her form during the completion of the exercises.  The patient notes feeling ok at the end of the session.  The patient will benefit from continued PT to address impairments and achieve patient specific functional goals.        Plan: Continue per plan of care.  Progress treatment as tolerated.       Precautions: chronic pain/asthma  RE-EVAL:  [id number]     Specialty Daily Treatment Diary     Manual        Lumbar P/A * DONE AD              Manual nerve glides        Piriformis Stretch B/L        Prone Quad stretch            Therapeutic Exercise       Treadmill Ambulation for endurance        UBE Stand ALT FWD/BACK for core/posture strength * 90/70 x 4 mins ALT                              Bridges *               Prone press ups x10 X10  X10 w/ O.P. after P/A      Standing back extensions x10 x10      Hip sags                        Stand Hip ABD+EXT * X15 B/L              Neuro Re-ed        Core brace * x15      kegels * x15      Multifidi * X10 ant tilt +finger lift      Glute sets * Prone x15      Quad DLS UE  LE  UE+LE *       Quad knee lifts         Clam shells        MTP/LTP/ Anti-rotations        Towel roll education, Posture correction; movement precautions DONE and added sleep position education Instructed on making a night roll at home with belt and towel      Lift/chop T-band        SLB *       Posture corrections seated *               Sidestepping        Heel toe Amb *                       Therapeutic Activity        Steps ups fwd/side        Crate carry        Crate lifts 3 levels        Lunges        Chair squats            Modalities        MHP/CP to L/B                                 The patient was given a new home exercise plan with instruction, pictures, and verbal feedback.  The patient accepts and understands the new home activities.

## 2024-07-24 ENCOUNTER — OFFICE VISIT (OUTPATIENT)
Dept: PHYSICAL THERAPY | Facility: CLINIC | Age: 49
End: 2024-07-24
Payer: COMMERCIAL

## 2024-07-24 DIAGNOSIS — M79.18 MYOFASCIAL PAIN SYNDROME: ICD-10-CM

## 2024-07-24 DIAGNOSIS — M47.816 LUMBAR SPONDYLOSIS: ICD-10-CM

## 2024-07-24 DIAGNOSIS — G89.4 CHRONIC PAIN SYNDROME: ICD-10-CM

## 2024-07-24 DIAGNOSIS — M51.16 LUMBAR DISC DISEASE WITH RADICULOPATHY: Primary | ICD-10-CM

## 2024-07-24 PROCEDURE — 97112 NEUROMUSCULAR REEDUCATION: CPT

## 2024-07-24 PROCEDURE — 97110 THERAPEUTIC EXERCISES: CPT

## 2024-07-24 NOTE — PROGRESS NOTES
Daily Note     Today's date: 2024  Patient name: Gabbie Cook  : 1975  MRN: [id number]  Referring provider: Janay Garza MD  Dx:   Encounter Diagnosis     ICD-10-CM    1. Lumbar disc disease with radiculopathy  M51.16       2. Lumbar spondylosis  M47.816       3. Chronic pain syndrome  G89.4       4. Myofascial pain syndrome  M79.18                      Subjective: The patient denies feeling any pain to start the session and has been feeling better.  Education given to the patient regarding long term management of LBP.      Objective: See treatment diary below      Assessment: The patient tolerated all activities well today.  The patient started new core strengthening activities without provoking symptom increase.  There were no complaints of increased pain or problems after the session today.  The patient will benefit from continued skilled physical therapy to progress towards achieving patient centered goals.         Plan: Continue per plan of care.  Progress treatment as tolerated.       Precautions: chronic pain/asthma  RE-EVAL:  [id number]     Specialty Daily Treatment Diary     Manual       Lumbar P/A * DONE AD DONE AD             Manual nerve glides        Piriformis Stretch B/L        Prone Quad stretch            Therapeutic Exercise      Treadmill Ambulation for endurance        UBE Stand ALT FWD/BACK for core/posture strength * 90/70 x 4 mins ALT 90/70 x 6 mins ALT                             Bridges *  x10             Prone press ups x10 X10  X10 w/ O.P. after P/A      Standing back extensions x10 x10 x10     Hip sags   X10  - discussed incorporating table plank                     Stand Hip ABD+EXT * X15 B/L              Neuro Re-ed        Core brace * x15 W/ posture correct     kegels * x15 W/ posture correct     Multifidi * X10 ant tilt +finger lift x10     Glute sets * Prone x15      Quad DLS UE  LE  UE+LE *  Bird dog x10 ALT     Quad knee lifts         Clam shells        MTP/LTP/ Anti-rotations        Towel roll education, Posture correction; movement precautions DONE and added sleep position education Instructed on making a night roll at home with belt and towel Educated on long term management of LBP and maintenance versus treatment approach     Lift/chop T-band        SLB *  2x:30     Posture corrections seated *  x10             Sidestepping        Heel toe Amb *  10ft x6 fwd/back                     Therapeutic Activity        Steps ups fwd/side        Crate carry        Crate lifts 3 levels        Lunges        Chair squats            Modalities        MHP/CP to L/B                                   The patient was given a new home exercise plan with instruction, pictures, and verbal feedback.  The patient accepts and understands the new home activities.

## 2024-07-31 ENCOUNTER — APPOINTMENT (OUTPATIENT)
Dept: PHYSICAL THERAPY | Facility: CLINIC | Age: 49
End: 2024-07-31
Payer: COMMERCIAL

## 2024-08-05 NOTE — PROGRESS NOTES
"PT Re-Evaluation  and PT Discharge    Today's date: 2024  Patient name: Gabbie Cook  : 1975  MRN: [id number]  Referring provider: Janay Garza MD  Dx:   Encounter Diagnosis     ICD-10-CM    1. Lumbar disc disease with radiculopathy  M51.16       2. Lumbar spondylosis  M47.816       3. Chronic pain syndrome  G89.4       4. Myofascial pain syndrome  M79.18                      Assessment  Symptom irritability: low    Assessment details: Gabbie Cook is a 49 y.o. female with a history of acute glaucoma, anemia, asthma that presents for a physical therapy RE evaluation / DISCHARGE.  The patient demonstrates signs and symptoms consistent with lumbar DDD with radiculopathy, chronic pain syndrome, myofascial pain syndrome, lumbar spondylosis.  During the examination the patient demonstrated improved core/LE strength, improved lumbar ROM, improved activity tolerance, and decreased L/B and R LE pain.  The patient has made functional gains since starting therapy.  The patient is now able to sit \" the right way\" for > 15 mins without a problem and sleeping has significantly improved.  The patient notes improvement with house work activities including picking weeds for short time periods.  The patient notes feeling independent with her HEP/ symptom management and would like to be discharged from formal PT.           Understanding of Dx/Px/POC: good     Prognosis: good  Prognosis details: Positive prognostic indicators include: positive attitude toward recovery, good understanding of diagnosis/treatment plan, and absence of observed red flags.      Negative prognostic indicators include: Significant medical Hx, chronicity of condition    Goals  STG: Achieve in 4-6 weeks  1.  Decrease lumbar pain at worst by 50% to improve activity tolerance for self/care and leisure activities. MET  2.  Improve LE/core strength by 1/2 muscle grade to improve ability to change body positions without difficulty. " "MET  3.  Improve lumbar ROM to \" minimal restriction\" to facilitate normal movement patterns for ADL's/work tasks. MET    LTG: Achieve in 8-12 weeks  1.  Patient's FOTO score improve to > 70% to indicate a return to normal functioning. MET  2.  Patient tolerate sleeping without needing medication and Sit for 15 minutes without pain to achieve patient specific goal. MET  3.  Patient achieve independence with performing home exercise plan. MET        Plan    Treatment plan discussed with: PTA and patient  Plan details: D/C PT TO AN INDEPENDENT HEP      Subjective Evaluation    History of Present Illness  Mechanism of injury: SUBJECTIVE: 8/7/2024: The patient reports improvement since starting therapy.  The patient is now able to sit \" the right way\" for > 15 mins without a problem and sleeping has significantly improved.  The patient notes improvement with house work activities including picking weeds for short time periods.  The patient notes feeling independent with her HEP/ symptom management and would like to be discharged from formal PT.             INJURY HISTORY: Gabbie Cook is a 49 y.o. female that presents to outpatient physical therapy with complaints of chronic low back pain, R leg pain and difficulty functioning.  Onset of LBP 20+ years prior after being pregnant with her son.  The patient notes intermittent LBP flare ups since.  The patient reports difficulty sleeping due to the pain, difficulty with prolonged standing, and intermittent LE tingling.  The patient has pain/difficulty with heavy activities / work.  Difficulty with picking weeds in spring, difficulty lifting weights, and difficulty with sitting prolonged time periods, and difficulty sleeping.  The patient notes difficulty with getting OOB with 1 episode of difficulty walking in the AM.  The patient notes R lower lateral leg \"ache\" which wakes the patient up during the night.  The patient was seen by spine and pain who referred the " patient to outpatient PT.  The patient's main goal for physical therapy is to be able to sit for 15 mins or longer and  to seep through the whole night without medication.     Addition to pmHx: Recent diagnosis of Lyme's disease          Recurrent probem    Patient Goals  Patient goal: be able to sit for 15 mins or longer and  to seep through the whole night without medication - MET  Pain  Current pain ratin  At best pain ratin  At worst pain ratin  Location: R sided low back pain; R hip to foot radiation  Quality: dull ache  Relieving factors: ice and heat  Aggravating factors: sitting and lifting (sleeping)  Progression: improved    Social Support  Steps to enter house: yes  Stairs in house: yes   Lives in: multiple-level home    Employment status: working (teacher)  Treatments  Previous treatment: physical therapy      Objective     Concurrent Complaints  Positive for night pain (1x/week) and disturbed sleep (1x/week). Negative for bladder dysfunction, bowel dysfunction, saddle (S4) numbness, history of cancer, history of trauma and infection (recent Lyme's diagnosis - on meds 7 days)    Additional Special Questions  The patient was given sleep position education information    Static Posture     Shoulders  Asymmetric shoulders.    Scapulae  Right depressed and left elevated.    Lumbar Spine   Decreased lordosis.   Lumbar spine (Right): Shifted.      Postural Observations  Seated posture: good  Standing posture: good  Correction of posture: makes symptoms better      Palpation     Additional Palpation Details  NO TTP    Tenderness     Additional Tenderness Details  NO TTP    Neurological Testing     Sensation     Lumbar   Left   Intact: light touch    Right   Intact: light touch    Reflexes   Left   Patellar (L4): normal (2+)  Achilles (S1): normal (2+)  Babinski sign: negative    Right   Patellar (L4): normal (2+)  Achilles (S1): normal (2+)  Babinski sign: negative    Active Range of Motion      Lumbar   Flexion:  WFL  Extension:  Restriction level: minimal  Left lateral flexion:  WFL  Right lateral flexion:  WFL  Left rotation:  Restriction level: minimal  Right rotation:  Restriction level: minimal    Additional Active Range of Motion Details  IMPROVED EXTENSION  Mechanical Assessment    Cervical      Thoracic      Lumbar    Standing extension: repeated movements  Pain location: no change  Lying extension: repeated movements  Pain location: no change    Strength/Myotome Testing     Left Hip   Planes of Motion   Flexion: 4+  Extension: 4  Abduction: 4+  Adduction: 4+    Right Hip   Planes of Motion   Flexion: 4+  Extension: 4  Abduction: 4+  Adduction: 4+    Left Knee   Flexion: 4  Extension: 4    Right Knee   Flexion: 4  Extension: 4    Left Ankle/Foot   Dorsiflexion: 5  Plantar flexion: 5  Great toe extension: 5    Right Ankle/Foot   Dorsiflexion: 5  Plantar flexion: 5  Great toe extension: 5    Muscle Activation     Additional Muscle Activation Details  IMPROVED TrA, multifidus, gluteal activation with transfers / position changes / dynamic movement      Tests     Lumbar   Negative SIJ compression and sacral spring .     Left   Negative passive SLR and slump test.     Right   Negative passive SLR and slump test.     Left Pelvic Girdle/Sacrum   Negative: active SLR test.     Right Pelvic Girdle/Sacrum   Negative: active SLR test.     Left Hip   Negative FADIR.     Right Hip   Negative FADIR.     Additional Tests Details  FOTO: 94% ( predicted 69%)    Graphical documentation:                  Precautions: chronic pain/asthma  RE-EVAL:9/4  [id number]   [phone]   Specialty Daily Treatment Diary     Manual  7/8 7/17 7/24 8/7    Lumbar P/A * DONE AD DONE AD DONE AD            Manual nerve glides        Piriformis Stretch B/L        Prone Quad stretch            Therapeutic Exercise 7/8 7/17 7/24 8/7    Treadmill Ambulation for endurance        UBE Stand ALT FWD/BACK for core/posture strength * 90/70 x 4  mins ALT 90/70 x 6 mins ALT 90/70 x 8 mins ALT                            Bridges *  x10             Prone press ups x10 X10  X10 w/ O.P. after P/A  X10  X10 w/ clin O.P.    Standing back extensions x10 x10 x10     Hip sags   X10  - discussed incorporating table plank                     Stand Hip ABD+EXT * X15 B/L              Neuro Re-ed        Core brace * x15 W/ posture correct     kegels * x15 W/ posture correct     Multifidi * X10 ant tilt +finger lift x10     Glute sets * Prone x15      Quad DLS UE  LE  UE+LE *  Bird dog x10 ALT     Quad knee lifts        Clam shells        MTP/LTP/ Anti-rotations        Towel roll education, Posture correction; movement precautions DONE and added sleep position education Instructed on making a night roll at home with belt and towel Educated on long term management of LBP and maintenance versus treatment approach reviewed    Lift/chop T-band        SLB *  2x:30     Posture corrections seated *  x10             Sidestepping        Heel toe Amb *  10ft x6 fwd/back                     Therapeutic Activity        Steps ups fwd/side        Crate carry        Crate lifts 3 levels        Lunges        Chair squats            Modalities        MHP/CP to L/B

## 2024-08-07 ENCOUNTER — EVALUATION (OUTPATIENT)
Dept: PHYSICAL THERAPY | Facility: CLINIC | Age: 49
End: 2024-08-07
Payer: COMMERCIAL

## 2024-08-07 DIAGNOSIS — G89.4 CHRONIC PAIN SYNDROME: ICD-10-CM

## 2024-08-07 DIAGNOSIS — M47.816 LUMBAR SPONDYLOSIS: ICD-10-CM

## 2024-08-07 DIAGNOSIS — M79.18 MYOFASCIAL PAIN SYNDROME: ICD-10-CM

## 2024-08-07 DIAGNOSIS — M51.16 LUMBAR DISC DISEASE WITH RADICULOPATHY: Primary | ICD-10-CM

## 2024-08-07 PROCEDURE — 97140 MANUAL THERAPY 1/> REGIONS: CPT | Performed by: PHYSICAL THERAPIST

## 2024-08-07 PROCEDURE — 97110 THERAPEUTIC EXERCISES: CPT | Performed by: PHYSICAL THERAPIST

## 2024-08-27 ENCOUNTER — OFFICE VISIT (OUTPATIENT)
Dept: URGENT CARE | Facility: CLINIC | Age: 49
End: 2024-08-27
Payer: COMMERCIAL

## 2024-08-27 VITALS
OXYGEN SATURATION: 98 % | DIASTOLIC BLOOD PRESSURE: 65 MMHG | SYSTOLIC BLOOD PRESSURE: 112 MMHG | TEMPERATURE: 98.1 F | HEART RATE: 65 BPM | WEIGHT: 168.6 LBS | RESPIRATION RATE: 14 BRPM | BODY MASS INDEX: 28.94 KG/M2

## 2024-08-27 DIAGNOSIS — S46.911A MUSCLE STRAIN OF RIGHT UPPER ARM, INITIAL ENCOUNTER: Primary | ICD-10-CM

## 2024-08-27 PROCEDURE — 99213 OFFICE O/P EST LOW 20 MIN: CPT | Performed by: NURSE PRACTITIONER

## 2024-08-27 RX ORDER — PREDNISONE 20 MG/1
TABLET ORAL
Qty: 12 TABLET | Refills: 0 | Status: SHIPPED | OUTPATIENT
Start: 2024-08-27

## 2024-08-27 RX ORDER — CYCLOBENZAPRINE HCL 5 MG
5 TABLET ORAL 3 TIMES DAILY PRN
Qty: 30 TABLET | Refills: 0 | Status: SHIPPED | OUTPATIENT
Start: 2024-08-27

## 2024-08-27 NOTE — PROGRESS NOTES
"  [facility]        NAME: Gabbie Cook is a 49 y.o. female  : 1975    MRN: [id number]  DATE: 2024  TIME: 5:16 PM      Assessment and Plan     Muscle strain of right upper arm, initial encounter [S46.911A]  1. Muscle strain of right upper arm, initial encounter  predniSONE 20 mg tablet    cyclobenzaprine (FLEXERIL) 5 mg tablet            Patient Instructions     Patient Instructions   Patient Education     Muscle strain   The Basics   Written by the doctors and editors at [facility]   What is a muscle strain? -- A muscle strain can happen when a muscle gets stretched too much or too quickly, or works too hard. This sometimes makes the muscle tear. Another term for a muscle strain is a \"pulled muscle.\"  A muscle strain can happen during an accident or exercise. Muscles that are commonly strained include those in the back, neck, and thigh.  What are the symptoms of a muscle strain? -- Symptoms happen in the area of the muscle strain and can include:  Pain  Muscle spasm or tightness  Swelling  Bruising  Weakness or being unable to move the muscle  Will I need tests? -- Probably not. Your doctor or nurse should be able to tell if you have a muscle strain by learning about your symptoms and doing an exam.  Some people need tests. Depending on your symptoms, your doctor or nurse might order an imaging test such as an ultrasound or MRI scan. Imaging tests create pictures of the inside of the body.  How is a muscle strain treated? -- A muscle strain usually gets better on its own, but it can take days to weeks to heal completely.  To help your symptoms get better, you can:  Rest your muscle - Avoid movements or activities that cause pain.  Ice the area - You can put a cold gel pack, bag of ice, or bag of frozen vegetables on the painful muscle every 1 to 2 hours, for 15 minutes each time. Put a thin towel between the ice (or other cold object) and your skin. Use the ice (or other cold object) " "for at least 6 hours after the injury. Some people find it helpful to ice up to 2 days after an injury.  Wrap your muscle - You can do this using an elastic bandage, other type of wrap, or fabric \"sleeve\" (picture 1). This helps support your muscle.  Raise the muscle above the level of your heart (if possible) - For example, you can prop your leg up on pillows. This is helpful only for the first few days after an injury.  Take medicine to reduce the pain and swelling - If you have a lot of pain or a severe muscle strain, your doctor will prescribe a strong pain medicine. If your strain is not severe, you can take an over-the-counter medicine such as acetaminophen (sample brand name: Tylenol), ibuprofen (sample brand names: Advil, Motrin), or naproxen (sample brand name: Aleve).  After your pain gets better, your doctor or nurse will recommend that you gently stretch and exercise your muscle. Stretches and exercises can help strengthen your muscles and keep them from getting too stiff. They can also help your joints stay flexible.  Your doctor or nurse will show you stretches and exercises to do. Or they will have you work with a physical therapist (exercise expert).  It's important to let your muscle heal before you play sports or do other activities that use the muscle again. If you don't let your muscle heal, you are likely to injure it again.  Can a muscle strain be prevented? -- You can help prevent a muscle strain by taking time to warm up your muscles before you exercise. You can do this by walking or doing another light activity. If you are not sure how to warm up before exercising, ask your doctor, nurse, or physical therapist.  What problems should I watch for? -- Call your doctor or nurse for advice if:  You can't move your injured muscle because of the pain.  The pain or swelling becomes worse.  You keep straining the same muscle.  You have new symptoms, or your symptoms are getting worse.  All topics are " updated as new evidence becomes available and our peer review process is complete.  This topic retrieved from [employer] on: Jan 11, 2024.  Topic 89521 Version 10.0  Release: 31.6.4 - C32.10  © 2024 UpToDate, Inc. and/or its affiliates. All rights reserved.  picture 1: Thigh sleeve     Wearing a thigh sleeve (the blue fabric band around the thigh) can help ease symptoms of a muscle strain.  Graphic 79156 Version 1.0  Consumer Information Use and Disclaimer   Disclaimer: This generalized information is a limited summary of diagnosis, treatment, and/or medication information. It is not meant to be comprehensive and should be used as a tool to help the user understand and/or assess potential diagnostic and treatment options. It does NOT include all information about conditions, treatments, medications, side effects, or risks that may apply to a specific patient. It is not intended to be medical advice or a substitute for the medical advice, diagnosis, or treatment of a health care provider based on the health care provider's examination and assessment of a patient's specific and unique circumstances. Patients must speak with a health care provider for complete information about their health, medical questions, and treatment options, including any risks or benefits regarding use of medications. This information does not endorse any treatments or medications as safe, effective, or approved for treating a specific patient. UpToDate, Inc. and its affiliates disclaim any warranty or liability relating to this information or the use thereof.The use of this information is governed by the Terms of Use, available at https://www.wolterskluwer.com/en/know/clinical-effectiveness-terms. 2024© UpToDate, Inc. and its affiliates and/or licensors. All rights reserved.  Copyright   © 2024 UpToDate, Inc. and/or its affiliates. All rights reserved.      Follow up with PCP in 3-5 days.  Proceed to  ER if symptoms worsen.    Chief Complaint      Chief Complaint   Patient presents with    Arm Pain     Onset right arm pain Sunday.  Was mountain biking Saturday, grazed the tree then turned and grabbed the tree so she did not fall--thought she was ok, road 6 more miles without pain until the next morning.         History of Present Illness     Onset right arm pain Sunday.  Was mountain biking Saturday, grazed the tree then turned and grabbed the tree so she did not fall--thought she was ok, road 6 more miles without pain until the next morning.        Review of Systems     Review of Systems   Musculoskeletal:  Positive for arthralgias and myalgias.   All other systems reviewed and are negative.        Current Medications       Current Outpatient Medications:     cyclobenzaprine (FLEXERIL) 5 mg tablet, Take 1 tablet (5 mg total) by mouth 3 (three) times a day as needed for muscle spasms, Disp: 30 tablet, Rfl: 0    predniSONE 20 mg tablet, 1 tab BID x 4 days then 1 tab daily x 4 days, Disp: 12 tablet, Rfl: 0    albuterol (PROVENTIL HFA,VENTOLIN HFA) 90 mcg/act inhaler, Inhale 2 puffs every 6 (six) hours as needed for wheezing, Disp: 18 g, Rfl: 0    ALPRAZolam (XANAX) 0.5 mg tablet, Take 1 tablet (0.5 mg total) by mouth 2 (two) times a day as needed for anxiety, Disp: 30 tablet, Rfl: 0    diclofenac sodium (VOLTAREN) 50 mg EC tablet, Take 1 tablet (50 mg total) by mouth 2 (two) times a day, Disp: 60 tablet, Rfl: 1    Fexofenadine-Pseudoephedrine (ALLEGRA-D 24 HOUR PO), Take by mouth, Disp: , Rfl:     Current Allergies     Allergies as of 08/27/2024    (No Known Allergies)              The following portions of the patient's history were reviewed and updated as appropriate: allergies, current medications, past family history, past medical history, past social history, past surgical history and problem list.     Past Medical History:   Diagnosis Date    Acute closed-angle glaucoma     Anemia     Asthma        Past Surgical History:   Procedure Laterality Date     EYE SURGERY      MASS EXCISION Right 11/10/2017    Procedure: OPEN EXPLORATION THIRD TOE POSSIBLE REMOVAL OF SOFT TISSUE LESION  FROZEN SECTION,INCISIONAL BONE BIOPSY BASE MIDDLE PHALANAX THIRD TOE;  Surgeon: Cortez Springer DPM;  Location: [facility];  Service: Podiatry    WISDOM TOOTH EXTRACTION         Family History   Problem Relation Age of Onset    Colon cancer Mother 83    Cancer Father     Colon cancer Maternal Grandfather 75         Medications have been verified.        Objective     /65   Pulse 65   Temp 98.1 °F (36.7 °C)   Resp 14   Wt 76.5 kg (168 lb 9.6 oz)   SpO2 98%   BMI 28.94 kg/m²   No LMP recorded.         Physical Exam     Physical Exam  Vitals and nursing note reviewed.   Constitutional:       General: She is not in acute distress.     Appearance: Normal appearance. She is well-developed. She is not ill-appearing, toxic-appearing or diaphoretic.   HENT:      Head: Normocephalic and atraumatic.   Pulmonary:      Effort: Pulmonary effort is normal. No respiratory distress.   Abdominal:      General: There is no distension.   Musculoskeletal:         General: Tenderness present. Normal range of motion.      Right shoulder: Normal. No tenderness or bony tenderness.      Right upper arm: Tenderness (tender mid upper arm with mild muscle tension) present. No bony tenderness.      Right elbow: Normal. No tenderness.      Cervical back: Normal range of motion and neck supple.   Skin:     General: Skin is warm and dry.      Capillary Refill: Capillary refill takes less than 2 seconds.   Neurological:      General: No focal deficit present.      Mental Status: She is alert and oriented to person, place, and time.   Psychiatric:         Mood and Affect: Mood and affect normal.         Behavior: Behavior normal. Behavior is cooperative.         Thought Content: Thought content normal.         Judgment: Judgment normal.

## 2024-08-27 NOTE — PATIENT INSTRUCTIONS
"Patient Education     Muscle strain   The Basics   Written by the doctors and editors at [facility]   What is a muscle strain? -- A muscle strain can happen when a muscle gets stretched too much or too quickly, or works too hard. This sometimes makes the muscle tear. Another term for a muscle strain is a \"pulled muscle.\"  A muscle strain can happen during an accident or exercise. Muscles that are commonly strained include those in the back, neck, and thigh.  What are the symptoms of a muscle strain? -- Symptoms happen in the area of the muscle strain and can include:  Pain  Muscle spasm or tightness  Swelling  Bruising  Weakness or being unable to move the muscle  Will I need tests? -- Probably not. Your doctor or nurse should be able to tell if you have a muscle strain by learning about your symptoms and doing an exam.  Some people need tests. Depending on your symptoms, your doctor or nurse might order an imaging test such as an ultrasound or MRI scan. Imaging tests create pictures of the inside of the body.  How is a muscle strain treated? -- A muscle strain usually gets better on its own, but it can take days to weeks to heal completely.  To help your symptoms get better, you can:  Rest your muscle - Avoid movements or activities that cause pain.  Ice the area - You can put a cold gel pack, bag of ice, or bag of frozen vegetables on the painful muscle every 1 to 2 hours, for 15 minutes each time. Put a thin towel between the ice (or other cold object) and your skin. Use the ice (or other cold object) for at least 6 hours after the injury. Some people find it helpful to ice up to 2 days after an injury.  Wrap your muscle - You can do this using an elastic bandage, other type of wrap, or fabric \"sleeve\" (picture 1). This helps support your muscle.  Raise the muscle above the level of your heart (if possible) - For example, you can prop your leg up on pillows. This is helpful only for the first few days after an " injury.  Take medicine to reduce the pain and swelling - If you have a lot of pain or a severe muscle strain, your doctor will prescribe a strong pain medicine. If your strain is not severe, you can take an over-the-counter medicine such as acetaminophen (sample brand name: Tylenol), ibuprofen (sample brand names: Advil, Motrin), or naproxen (sample brand name: Aleve).  After your pain gets better, your doctor or nurse will recommend that you gently stretch and exercise your muscle. Stretches and exercises can help strengthen your muscles and keep them from getting too stiff. They can also help your joints stay flexible.  Your doctor or nurse will show you stretches and exercises to do. Or they will have you work with a physical therapist (exercise expert).  It's important to let your muscle heal before you play sports or do other activities that use the muscle again. If you don't let your muscle heal, you are likely to injure it again.  Can a muscle strain be prevented? -- You can help prevent a muscle strain by taking time to warm up your muscles before you exercise. You can do this by walking or doing another light activity. If you are not sure how to warm up before exercising, ask your doctor, nurse, or physical therapist.  What problems should I watch for? -- Call your doctor or nurse for advice if:  You can't move your injured muscle because of the pain.  The pain or swelling becomes worse.  You keep straining the same muscle.  You have new symptoms, or your symptoms are getting worse.  All topics are updated as new evidence becomes available and our peer review process is complete.  This topic retrieved from [employer] on: Jan 11, 2024.  Topic 73405 Version 10.0  Release: 31.6.4 - C32.10  © 2024 UpToDate, Inc. and/or its affiliates. All rights reserved.  picture 1: Thigh sleeve     Wearing a thigh sleeve (the blue fabric band around the thigh) can help ease symptoms of a muscle strain.  Graphic 09485 Version  1.0  Consumer Information Use and Disclaimer   Disclaimer: This generalized information is a limited summary of diagnosis, treatment, and/or medication information. It is not meant to be comprehensive and should be used as a tool to help the user understand and/or assess potential diagnostic and treatment options. It does NOT include all information about conditions, treatments, medications, side effects, or risks that may apply to a specific patient. It is not intended to be medical advice or a substitute for the medical advice, diagnosis, or treatment of a health care provider based on the health care provider's examination and assessment of a patient's specific and unique circumstances. Patients must speak with a health care provider for complete information about their health, medical questions, and treatment options, including any risks or benefits regarding use of medications. This information does not endorse any treatments or medications as safe, effective, or approved for treating a specific patient. UpToDate, Inc. and its affiliates disclaim any warranty or liability relating to this information or the use thereof.The use of this information is governed by the Terms of Use, available at https://www.woltersiDiDiDuwer.com/en/know/clinical-effectiveness-terms. 2024© UpToDate, Inc. and its affiliates and/or licensors. All rights reserved.  Copyright   © 2024 UpToDate, Inc. and/or its affiliates. All rights reserved.

## 2024-11-07 DIAGNOSIS — B34.9 ACUTE VIRAL SYNDROME: Primary | ICD-10-CM

## 2024-11-07 DIAGNOSIS — J45.20 MILD INTERMITTENT ASTHMA WITHOUT COMPLICATION: ICD-10-CM

## 2024-11-07 RX ORDER — ALBUTEROL SULFATE 90 UG/1
2 INHALANT RESPIRATORY (INHALATION) EVERY 6 HOURS PRN
Qty: 18 G | Refills: 0 | Status: SHIPPED | OUTPATIENT
Start: 2024-11-07

## 2024-11-07 RX ORDER — PREDNISONE 20 MG/1
20 TABLET ORAL 2 TIMES DAILY WITH MEALS
Qty: 10 TABLET | Refills: 0 | Status: SHIPPED | OUTPATIENT
Start: 2024-11-07 | End: 2024-11-12

## 2025-01-06 ENCOUNTER — OFFICE VISIT (OUTPATIENT)
Dept: FAMILY MEDICINE CLINIC | Facility: CLINIC | Age: 50
End: 2025-01-06
Payer: COMMERCIAL

## 2025-01-06 VITALS
WEIGHT: 165 LBS | DIASTOLIC BLOOD PRESSURE: 60 MMHG | SYSTOLIC BLOOD PRESSURE: 100 MMHG | BODY MASS INDEX: 28.17 KG/M2 | HEART RATE: 68 BPM | HEIGHT: 64 IN | OXYGEN SATURATION: 98 % | TEMPERATURE: 97.2 F | RESPIRATION RATE: 18 BRPM

## 2025-01-06 DIAGNOSIS — Z00.00 ANNUAL PHYSICAL EXAM: Primary | ICD-10-CM

## 2025-01-06 DIAGNOSIS — Z23 ENCOUNTER FOR IMMUNIZATION: ICD-10-CM

## 2025-01-06 PROCEDURE — 90471 IMMUNIZATION ADMIN: CPT

## 2025-01-06 PROCEDURE — 90715 TDAP VACCINE 7 YRS/> IM: CPT

## 2025-01-06 PROCEDURE — 99396 PREV VISIT EST AGE 40-64: CPT | Performed by: NURSE PRACTITIONER

## 2025-01-06 NOTE — PROGRESS NOTES
Adult Annual Physical  Name: Gabbie Cook      : 1975      MRN: [id number]  Encounter Provider: JULIÁN Fam  Encounter Date: 2025   Encounter department: [facility]    Assessment & Plan  Encounter for immunization    Orders:    TDAP VACCINE GREATER THAN OR EQUAL TO 6YO IM    Annual physical exam         Immunizations and preventive care screenings were discussed with patient today. Appropriate education was printed on patient's after visit summary.          Depression Screening and Follow-up Plan: Patient was screened for depression during today's encounter. They screened negative with a PHQ-2 score of 0.        History of Present Illness     Adult Annual Physical:  Patient presents for annual physical. Annual physical- no complaints/concerns, reviewed labs from last year- defer labs for this year.     Depression Screening:  - PHQ-2 Score: 0    Review of Systems   Constitutional:  Negative for activity change, diaphoresis, fatigue and fever.   HENT:  Negative for congestion, facial swelling, hearing loss, rhinorrhea, sinus pressure, sinus pain, sneezing, sore throat and voice change.    Eyes:  Negative for discharge and visual disturbance.   Respiratory:  Negative for cough, choking, chest tightness, shortness of breath, wheezing and stridor.    Cardiovascular:  Negative for chest pain, palpitations and leg swelling.   Gastrointestinal:  Negative for abdominal distention, abdominal pain, constipation, diarrhea, nausea and vomiting.   Endocrine: Negative for polydipsia, polyphagia and polyuria.   Genitourinary:  Negative for difficulty urinating, dysuria, frequency and urgency.   Musculoskeletal:  Positive for arthralgias (right 5th finger- she attributes to arthritis/genetics). Negative for back pain, gait problem, joint swelling, myalgias, neck pain and neck stiffness.   Skin:  Negative for color change, rash and wound.   Neurological:  Negative for dizziness,  "syncope, speech difficulty, weakness, light-headedness and headaches.   Hematological:  Negative for adenopathy. Does not bruise/bleed easily.   Psychiatric/Behavioral:  Negative for agitation, behavioral problems, confusion, hallucinations, sleep disturbance and suicidal ideas. The patient is not nervous/anxious.      Medical History Reviewed by provider this encounter:  Tobacco  Allergies  Meds  Problems  Med Hx  Surg Hx  Fam Hx     .    Objective   /60   Pulse 68   Temp (!) 97.2 °F (36.2 °C)   Resp 18   Ht 5' 4\" (1.626 m)   Wt 74.8 kg (165 lb)   SpO2 98%   BMI 28.32 kg/m²     Physical Exam  Vitals and nursing note reviewed.   Constitutional:       General: She is not in acute distress.     Appearance: Normal appearance. She is well-developed. She is not diaphoretic.   HENT:      Head: Normocephalic and atraumatic.      Right Ear: Tympanic membrane, ear canal and external ear normal.      Left Ear: Tympanic membrane, ear canal and external ear normal.      Nose: Nose normal.      Mouth/Throat:      Mouth: Mucous membranes are moist.      Pharynx: Oropharynx is clear. Uvula midline.      Tonsils: No tonsillar exudate.   Eyes:      General:         Right eye: No discharge.         Left eye: No discharge.      Conjunctiva/sclera: Conjunctivae normal.      Pupils: Pupils are equal, round, and reactive to light.   Neck:      Thyroid: No thyromegaly.      Trachea: No tracheal deviation.   Cardiovascular:      Rate and Rhythm: Normal rate and regular rhythm.      Heart sounds: Normal heart sounds. No murmur heard.     No friction rub. No gallop.   Pulmonary:      Effort: Pulmonary effort is normal. No respiratory distress.      Breath sounds: Normal breath sounds. No wheezing.   Abdominal:      General: Bowel sounds are normal. There is no distension.      Palpations: Abdomen is soft. There is no mass.      Tenderness: There is no abdominal tenderness. There is no guarding.   Musculoskeletal:         " General: No tenderness or deformity. Normal range of motion.      Cervical back: Normal range of motion and neck supple.   Lymphadenopathy:      Cervical: No cervical adenopathy.   Skin:     General: Skin is warm and dry.      Findings: No erythema or rash.   Neurological:      Mental Status: She is alert and oriented to person, place, and time.   Psychiatric:         Mood and Affect: Mood normal.         Speech: Speech normal.         Behavior: Behavior normal.         Thought Content: Thought content normal.         Judgment: Judgment normal.

## 2025-06-24 ENCOUNTER — OFFICE VISIT (OUTPATIENT)
Dept: FAMILY MEDICINE CLINIC | Facility: CLINIC | Age: 50
End: 2025-06-24
Payer: COMMERCIAL

## 2025-06-24 ENCOUNTER — APPOINTMENT (OUTPATIENT)
Dept: RADIOLOGY | Facility: CLINIC | Age: 50
End: 2025-06-24
Payer: COMMERCIAL

## 2025-06-24 VITALS
OXYGEN SATURATION: 98 % | HEART RATE: 60 BPM | DIASTOLIC BLOOD PRESSURE: 76 MMHG | RESPIRATION RATE: 18 BRPM | BODY MASS INDEX: 28.17 KG/M2 | SYSTOLIC BLOOD PRESSURE: 128 MMHG | HEIGHT: 64 IN | TEMPERATURE: 97.7 F | WEIGHT: 165 LBS

## 2025-06-24 DIAGNOSIS — G89.29 CHRONIC RIGHT SHOULDER PAIN: ICD-10-CM

## 2025-06-24 DIAGNOSIS — M25.511 CHRONIC RIGHT SHOULDER PAIN: ICD-10-CM

## 2025-06-24 DIAGNOSIS — M79.672 LEFT FOOT PAIN: ICD-10-CM

## 2025-06-24 DIAGNOSIS — G89.29 CHRONIC RIGHT SHOULDER PAIN: Primary | ICD-10-CM

## 2025-06-24 DIAGNOSIS — M25.511 CHRONIC RIGHT SHOULDER PAIN: Primary | ICD-10-CM

## 2025-06-24 PROCEDURE — 73030 X-RAY EXAM OF SHOULDER: CPT

## 2025-06-24 PROCEDURE — 73630 X-RAY EXAM OF FOOT: CPT

## 2025-06-24 PROCEDURE — 99213 OFFICE O/P EST LOW 20 MIN: CPT | Performed by: NURSE PRACTITIONER

## 2025-06-24 NOTE — PROGRESS NOTES
"Name: Gabbie Cook      : 1975      MRN: [id number]  Encounter Provider: JULIÁN Fam  Encounter Date: 2025   Encounter department: [facility]  :  Assessment & Plan  Chronic right shoulder pain    Orders:    XR shoulder 2+ vw right; Future    Ambulatory Referral to Orthopedic Surgery; Future    Left foot pain    Orders:    XR foot 3+ vw left; Future    Ambulatory Referral to Podiatry; Future           History of Present Illness   C/o right shoulder pain only with sleeping at night and trying to reach around to scratch her back- has full ROM without difficulty- no known injury    C/o left foot pain along middle 3 toes- no known injury      Review of Systems   Musculoskeletal:  Positive for arthralgias.       Objective   /76   Pulse 60   Temp 97.7 °F (36.5 °C)   Resp 18   Ht 5' 4\" (1.626 m)   Wt 74.8 kg (165 lb)   SpO2 98%   BMI 28.32 kg/m²      Physical Exam  Vitals and nursing note reviewed.   Constitutional:       General: She is not in acute distress.     Appearance: Normal appearance. She is well-developed. She is not diaphoretic.   Pulmonary:      Effort: Pulmonary effort is normal. No respiratory distress.     Musculoskeletal:         General: Tenderness present.      Right shoulder: Tenderness present. No bony tenderness or crepitus. Normal range of motion.        Feet:    Feet:      Comments: Tenderness, no swelling, no skin changes    Skin:     Coloration: Skin is not pale.     Neurological:      Mental Status: She is alert and oriented to person, place, and time.     Psychiatric:         Speech: Speech normal.         Behavior: Behavior normal.         Thought Content: Thought content normal.         Judgment: Judgment normal.         "

## 2025-06-26 ENCOUNTER — TELEPHONE (OUTPATIENT)
Age: 50
End: 2025-06-26

## 2025-07-07 ENCOUNTER — OFFICE VISIT (OUTPATIENT)
Dept: OBGYN CLINIC | Facility: OTHER | Age: 50
End: 2025-07-07
Payer: COMMERCIAL

## 2025-07-07 DIAGNOSIS — M75.41 SUBACROMIAL IMPINGEMENT OF RIGHT SHOULDER: Primary | ICD-10-CM

## 2025-07-07 PROCEDURE — 20610 DRAIN/INJ JOINT/BURSA W/O US: CPT | Performed by: ORTHOPAEDIC SURGERY

## 2025-07-07 PROCEDURE — 99204 OFFICE O/P NEW MOD 45 MIN: CPT | Performed by: ORTHOPAEDIC SURGERY

## 2025-07-07 RX ORDER — BETAMETHASONE SODIUM PHOSPHATE AND BETAMETHASONE ACETATE 3; 3 MG/ML; MG/ML
6 INJECTION, SUSPENSION INTRA-ARTICULAR; INTRALESIONAL; INTRAMUSCULAR; SOFT TISSUE
Status: COMPLETED | OUTPATIENT
Start: 2025-07-07 | End: 2025-07-07

## 2025-07-07 RX ORDER — BUPIVACAINE HYDROCHLORIDE 2.5 MG/ML
2 INJECTION, SOLUTION INFILTRATION; PERINEURAL
Status: COMPLETED | OUTPATIENT
Start: 2025-07-07 | End: 2025-07-07

## 2025-07-07 RX ADMIN — BETAMETHASONE SODIUM PHOSPHATE AND BETAMETHASONE ACETATE 6 MG: 3; 3 INJECTION, SUSPENSION INTRA-ARTICULAR; INTRALESIONAL; INTRAMUSCULAR; SOFT TISSUE at 08:30

## 2025-07-07 RX ADMIN — BUPIVACAINE HYDROCHLORIDE 2 ML: 2.5 INJECTION, SOLUTION INFILTRATION; PERINEURAL at 08:30

## 2025-07-07 NOTE — ASSESSMENT & PLAN NOTE
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  I have discussed with the patient the pathophysiology of this diagnosis and reviewed how the examination correlates with this diagnosis.  Treatment options were discussed at length and after discussing these treatment options, the patient elected for and received a subacromial injection of corticosteroid (as described in the procedure note) with a prescription for referral to physical therapy.    We will reevaluate the patient in 6-8 weeks.  If the symptoms fail to improve with this treatment the patient would be indicated for further imaging in the form of an MRI scan of the shoulder.

## 2025-07-07 NOTE — PROGRESS NOTES
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  I have discussed with the patient the pathophysiology of this diagnosis and reviewed how the examination correlates with this diagnosis.  Treatment options were discussed at length and after discussing these treatment options, the patient elected for and received a subacromial injection of corticosteroid (as described in the procedure note) with a prescription for referral to physical therapy.    We will reevaluate the patient in 6-8 weeks.  If the symptoms fail to improve with this treatment the patient would be indicated for further imaging in the form of an MRI scan of the shoulder.      Subjective:   Patient ID: Gabbie Cook is a 50 y.o. female presents with a chief complaint of right shoulder pain.   Patient is referred as an orthopedic surgical consultation from her primary care provider JULIÁN Fam from their office visit on June 25, 2025.  The pain began a few month(s) ago and is not associated with an acute injury. The patient describes the pain as aching and dull in intensity,  intermittent, awakening patient from sleep in timing, and localizes the pain to the  right subacromial joint, deltoid.  The pain is worse with overhead work, overuse, and raising arm over head and relieved by rest, ice, avoiding the painful activities.  The pain is not associated with numbness and tingling.  The pain is not associated with constitutional symptoms. The patient is awoken at night by the pain.    The patient has had no prior treatment.        The following portions of the patient's history were reviewed and updated as appropriate: allergies, current medications, past family history, past medical history, past social history, past surgical history and problem list.    Objective:        Right Shoulder Exam     Range of Motion   The patient  "has normal right shoulder ROM.    Muscle Strength   Abduction: 5/5   External rotation: 5/5     Tests   Anne test: positive  Impingement: positive  Drop arm: negative    Other   Erythema: absent  Sensation: normal  Pulse: present            Physical Exam  Constitutional:       Appearance: She is well-developed.     Eyes:      Pupils: Pupils are equal, round, and reactive to light.     Pulmonary:      Effort: Pulmonary effort is normal.      Breath sounds: Normal breath sounds.     Skin:     General: Skin is warm and dry.     Neurological:      Mental Status: She is alert and oriented to person, place, and time.     Psychiatric:         Behavior: Behavior normal.         Thought Content: Thought content normal.         Judgment: Judgment normal.       Large joint arthrocentesis: R subacromial bursa    Performed by: Kristofer Green MD  Authorized by: Kristofer Green MD    [place] Protocol:  procedure performed by consultantConsent: Verbal consent obtained  Risks and benefits: risks, benefits and alternatives were discussed  Consent given by: patient  Time out: Immediately prior to procedure a \"time out\" was called to verify the correct patient, procedure, equipment, support staff and site/side marked as required.  Site marked: the operative site was marked  Radiology Images displayed and confirmed. If images not available, report reviewed: imaging studies available  Patient identity confirmed: verbally with patient  Supporting Documentation  Indications: pain and diagnostic evaluation     Is this a Visco injection? NoProcedure Details  Location: shoulder - R subacromial bursa  Preparation: Patient was prepped and draped in the usual sterile fashion  Needle size: 22 G  Ultrasound guidance: no  Approach: lateral  Medications administered: 2 mL bupivacaine 0.25 %; 6 mg betamethasone acetate-betamethasone sodium phosphate 6 (3-3) mg/mL    Patient tolerance: patient tolerated the procedure well with " no immediate complications  Dressing:  Sterile dressing applied        I have personally reviewed pertinent films in PACS and my interpretation is as follows.    X Ray Right Shoulder 6/24/25: No acute osseous abnormalities or degenerative changes      Records Reviewed: office notes from PCP Donny 6/24/2025    Scribe Attestation      I,:  Paul Enciso am acting as a scribe while in the presence of the attending physician.:       I,:  Kristofer Green MD personally performed the services described in this documentation    as scribed in my presence.:

## 2025-07-16 ENCOUNTER — EVALUATION (OUTPATIENT)
Dept: PHYSICAL THERAPY | Facility: CLINIC | Age: 50
End: 2025-07-16
Attending: ORTHOPAEDIC SURGERY
Payer: COMMERCIAL

## 2025-07-16 VITALS — SYSTOLIC BLOOD PRESSURE: 92 MMHG | HEART RATE: 61 BPM | DIASTOLIC BLOOD PRESSURE: 59 MMHG

## 2025-07-16 DIAGNOSIS — M25.511 CHRONIC RIGHT SHOULDER PAIN: Primary | ICD-10-CM

## 2025-07-16 DIAGNOSIS — G89.29 CHRONIC RIGHT SHOULDER PAIN: Primary | ICD-10-CM

## 2025-07-16 DIAGNOSIS — M75.41 SUBACROMIAL IMPINGEMENT OF RIGHT SHOULDER: ICD-10-CM

## 2025-07-16 PROCEDURE — 97162 PT EVAL MOD COMPLEX 30 MIN: CPT

## 2025-07-16 PROCEDURE — 97112 NEUROMUSCULAR REEDUCATION: CPT

## 2025-07-16 NOTE — PROGRESS NOTES
PT Evaluation     Today's date: 2025  Patient name: Gabbie Cook  : 1975  MRN: [id number]  Referring provider: Kristofer Green*  Dx:   Encounter Diagnosis     ICD-10-CM    1. Chronic right shoulder pain  M25.511     G89.29       2. Subacromial impingement of right shoulder  M75.41 Ambulatory Referral to Physical Therapy          Start Time: 1315  Stop Time: 1400  Total time in clinic (min): 45 minutes    Assessment    Assessment details: Problem List:  1) R shoulder hypomobility- addressed with manual therapy, neuro-re-education and therapeutic exercises  2) R shoulder movement coordination deficits- addressed with neuro-re-education, therapeutic exercises/activities  3) RUE strength deficits- addressed with neuro-re-education, therapeutic exercises/activities  4) Activity Intolerance- addressed with neuro-re-education, therapeutic exercises/activities    Gabbie Cook is a pleasant 50 y.o. female who presents with R shoulder pain.  she has R shoulder hypomobility, nociceptive pain, and symptoms consistent with SAPS resulting in difficulty lifting and overhead movements.  No further referral appears necessary at this time based upon examination results.  I expect she will improve with progressive AROM and strengthening. Positive prognostic indicators include pleasant and positive attitude toward recovery. Negative prognostic indicators include lumbar spondylosis, chronic pain syndrome. Patient to benefit from skilled PT interventions to address aforementioned impairments to RTPLOF.      Comparable signs:  1) lifting  2) overhead movements    Goals  STG: (within 4-6 weeks)  1. Patient to reduce pain at its worst by at least 25%.  2. Patient to restore full R shoulder IR and ER BTB without pain.  3. Patient to be able to reach across her body without pain.    LTG: (upon discharge)  1. Patient to be independent with HEP.  2. Patient to be able to manage symptoms independently.  3. Patient  to be able to sleep on R side without pain.    Plan  Patient would benefit from: skilled physical therapy  Planned modality interventions: neuromuscular electric stimulation, thermotherapy: hydrocollator packs, traction and cryotherapy    Planned therapy interventions: therapeutic exercise, therapeutic activities, manual therapy and neuromuscular re-education    Frequency: 1-2x week  Duration in weeks: 8  Plan of Care beginning date: 2025  Plan of Care expiration date: 9/10/2025  Treatment plan discussed with: patient  Plan details: Discussed POC and HEP with patient, patient agreeable to both. Reassess in 4 weeks.      Subjective Evaluation    History of Present Illness  Mechanism of injury: Gabbie Cook is a 50 y.o. female who presents to [facility] for initial evaluation, with reports of R shoulder pain. Symptoms started 2025, SHAKILA: insidious. Had corticosteroid injection back on 25 and says since then she has had more pain. They have stayed the same/progressively worsened/gotten better since onset. Aggravating activities: rolling to sleep on R side, reaching across her body. Relieving activities/modalities: resting. Red Flags: denies numbness,. Occupation: Teacher. Goals: be able to sleep on R side without discomfort and reach across body. Currently able to play pickleball and lift without pain.     Pain  Current pain ratin  At best pain ratin  At worst pain rating: 3  Location: R shoulder pain  Quality: dull ache        Objective     Active Range of Motion   Left Shoulder   Flexion: WFL  Abduction: WFL  External rotation BTH: T2   Internal rotation BTB: T6     Right Shoulder   Flexion: WFL  Abduction: WFL and with pain  External rotation BTH: C7 with pain  Internal rotation BTB: T8 with pain    Additional Active Range of Motion Details  End range pain with R shoulder abduction, ER and IR BTB    Strength/Myotome Testing     Left Shoulder     Planes of Motion   Flexion: 5   Abduction: 5    External rotation at 0°: 5   External rotation at 90°: 5   Internal rotation at 0°: 5   Internal rotation at 90°: 5     Right Shoulder     Planes of Motion   Flexion: 5   Abduction: 5   External rotation at 0°: 5   External rotation at 90°: 5   Internal rotation at 0°: 5   Internal rotation at 90°: 5     Additional Strength Details  Minor discomfort with R abduction MMT    Tests     Right Shoulder   Positive Neer's and passive horizontal adduction.   Negative empty can, full can, Hawkin's and painful arc.     Additional Tests Details  Improved passive horiz add, and Neer's after repeated shoulder ext stretch             Precautions:   POC: 9/10/25  Re-Eval: 8/16/25  Access Code: [email or web address]   Manuals 7/16       GH inf mobs        PROM                        Neuro Re-Ed        Rep Shld Ext 2x10       Rep Shld IR w strap *       UBE (posture education) *       Scap Ret x10                               Ther Ex        Shld Row/Ext *       Prone Y/T/I *       ER/IR TBand *       D1/D2 flexion *       Horizontal abd *       No money *                       Ther Activity                        Gait Training                        Modalities                          Reviewed patient scheduling and HEP.

## 2025-07-22 ENCOUNTER — OFFICE VISIT (OUTPATIENT)
Dept: PHYSICAL THERAPY | Facility: CLINIC | Age: 50
End: 2025-07-22
Attending: ORTHOPAEDIC SURGERY
Payer: COMMERCIAL

## 2025-07-22 DIAGNOSIS — M25.511 CHRONIC RIGHT SHOULDER PAIN: Primary | ICD-10-CM

## 2025-07-22 DIAGNOSIS — M75.41 SUBACROMIAL IMPINGEMENT OF RIGHT SHOULDER: ICD-10-CM

## 2025-07-22 DIAGNOSIS — G89.29 CHRONIC RIGHT SHOULDER PAIN: Primary | ICD-10-CM

## 2025-07-22 PROCEDURE — 97110 THERAPEUTIC EXERCISES: CPT

## 2025-07-22 PROCEDURE — 97112 NEUROMUSCULAR REEDUCATION: CPT

## 2025-07-22 NOTE — PROGRESS NOTES
"Daily Note     Today's date: 2025  Patient name: Gabbie Cook  : 1975  MRN: [id number]  Referring provider: Kristofer Green*  Dx:   Encounter Diagnosis     ICD-10-CM    1. Chronic right shoulder pain  M25.511     G89.29       2. Subacromial impingement of right shoulder  M75.41           Start Time: 0904          Subjective: Melchor states she has a 2/10 anterior right shoulder. She feels the pain is \"not bad\".       Objective: See treatment diary below    Melchor's home exercise program updated to include additional exercises. Handout declined, but exercises accepted. Red theraband issued for home use. Demonstration given for HEP.    Assessment: Tolerated treatment well. Patient would benefit from continued PT for stretching and strengthening. She was able to add exercises to her program with little difficulty. She had no resting pain after second set of shoulder extension. A good stretch was felt with some of the new exercises. Patient seemed to understand all education on new exercises. She had no pain by the end of the session.       Plan: Continue per plan of care.  Progress treatment as tolerated.       Precautions:   POC: 9/10/25  Re-Eval: 25  Access Code: [email or web address]   Manuals       GH inf mobs        PROM                        Neuro Re-Ed        Rep Shld Ext 2x10 2x10      Rep Shld IR w strap *       UBE (posture education) * L1 x4 min ALT sit stand     Scap Ret x10 x10                              Ther Ex        Shld Row/Ext * RTB x10 ea      Prone Y/T/I *       ER/IR TBand * RTB 1x0 ea      D1/D2 flexion * X10 ea      Horizontal abd * RTB x10      No money * x10                      Ther Activity                        Gait Training                        Modalities                          Access Code: [email or web address]  URL: [email or web address]  Date: 2025  Prepared by: Keiry Greenberg    Exercises  - Seated Scapular Retraction  - 7 x weekly - 10 reps " - 3-5 hold  - Standing Shoulder Extension AAROM with Dowel  - 6-8 x daily - 7 x weekly - 10 reps  - Standing Single Arm Shoulder PNF D1 Flexion  - 2 x daily - 7 x weekly - 1 sets - 10 reps  - Shoulder PNF D2 Flexion  - 2 x daily - 7 x weekly - 1 sets - 10 reps  - Standing Shoulder Horizontal Abduction with Resistance  - 2 x daily - 7 x weekly - 1 sets - 10 reps  - Standing Shoulder Row with Anchored Resistance  - 2 x daily - 7 x weekly - 1 sets - 10 reps  - Shoulder extension with resistance - Neutral  - 2 x daily - 7 x weekly - 1 sets - 10 reps  - Shoulder External Rotation with Anchored Resistance  - 2 x daily - 7 x weekly - 1 sets - 10 reps  - Shoulder Internal Rotation with Resistance  - 2 x daily - 7 x weekly - 1 sets - 10 reps  - Shoulder External Rotation and Scapular Retraction with Resistance  - 2 x daily - 7 x weekly - 1 sets - 10 reps

## 2025-07-22 NOTE — HOME EXERCISE EDUCATION
Program_ID:[id number]   Access Code: [email or web address]  URL: [email or web address]  Date: 07-  Prepared By: Gilberto Daniel    Program Notes      Exercises      - Seated Scapular Retraction -  x daily - 7 x weekly -  sets - 10 reps - 3-5 hold      - Standing Shoulder Extension AAROM with Dowel - 6-8 x daily - 7 x weekly -  sets - 10 reps      - Standing Single Arm Shoulder PNF D1 Flexion - 2 x daily - 7 x weekly - 1 sets - 10 reps      - Shoulder PNF D2 Flexion - 2 x daily - 7 x weekly - 1 sets - 10 reps      - Standing Shoulder Horizontal Abduction with Resistance - 2 x daily - 7 x weekly - 1 sets - 10 reps      - Standing Shoulder Row with Anchored Resistance - 2 x daily - 7 x weekly - 1 sets - 10 reps      - Shoulder extension with resistance - Neutral - 2 x daily - 7 x weekly - 1 sets - 10 reps      - Shoulder External Rotation with Anchored Resistance - 2 x daily - 7 x weekly - 1 sets - 10 reps      - Shoulder Internal Rotation with Resistance - 2 x daily - 7 x weekly - 1 sets - 10 reps      - Shoulder External Rotation and Scapular Retraction with Resistance - 2 x daily - 7 x weekly - 1 sets - 10 reps

## 2025-07-29 ENCOUNTER — OFFICE VISIT (OUTPATIENT)
Dept: PHYSICAL THERAPY | Facility: CLINIC | Age: 50
End: 2025-07-29
Attending: ORTHOPAEDIC SURGERY
Payer: COMMERCIAL

## 2025-07-29 ENCOUNTER — OFFICE VISIT (OUTPATIENT)
Age: 50
End: 2025-07-29
Attending: NURSE PRACTITIONER
Payer: COMMERCIAL

## 2025-07-29 VITALS — BODY MASS INDEX: 28.68 KG/M2 | HEIGHT: 64 IN | WEIGHT: 168 LBS

## 2025-07-29 DIAGNOSIS — G89.29 CHRONIC RIGHT SHOULDER PAIN: Primary | ICD-10-CM

## 2025-07-29 DIAGNOSIS — M75.41 SUBACROMIAL IMPINGEMENT OF RIGHT SHOULDER: ICD-10-CM

## 2025-07-29 DIAGNOSIS — L90.9 FAT PAD ATROPHY OF FOOT: Primary | ICD-10-CM

## 2025-07-29 DIAGNOSIS — M25.511 CHRONIC RIGHT SHOULDER PAIN: Primary | ICD-10-CM

## 2025-07-29 DIAGNOSIS — M79.672 LEFT FOOT PAIN: ICD-10-CM

## 2025-07-29 DIAGNOSIS — M77.41 METATARSALGIA OF BOTH FEET: ICD-10-CM

## 2025-07-29 DIAGNOSIS — M77.42 METATARSALGIA OF BOTH FEET: ICD-10-CM

## 2025-07-29 PROCEDURE — 97112 NEUROMUSCULAR REEDUCATION: CPT

## 2025-07-29 PROCEDURE — 99203 OFFICE O/P NEW LOW 30 MIN: CPT | Performed by: PODIATRIST

## 2025-07-29 PROCEDURE — 97110 THERAPEUTIC EXERCISES: CPT

## 2025-08-01 ENCOUNTER — OFFICE VISIT (OUTPATIENT)
Dept: PHYSICAL THERAPY | Facility: CLINIC | Age: 50
End: 2025-08-01
Attending: ORTHOPAEDIC SURGERY
Payer: COMMERCIAL

## 2025-08-01 DIAGNOSIS — G89.29 CHRONIC RIGHT SHOULDER PAIN: Primary | ICD-10-CM

## 2025-08-01 DIAGNOSIS — M25.511 CHRONIC RIGHT SHOULDER PAIN: Primary | ICD-10-CM

## 2025-08-01 DIAGNOSIS — M75.41 SUBACROMIAL IMPINGEMENT OF RIGHT SHOULDER: ICD-10-CM

## 2025-08-01 PROCEDURE — 97110 THERAPEUTIC EXERCISES: CPT

## 2025-08-01 PROCEDURE — 97112 NEUROMUSCULAR REEDUCATION: CPT

## 2025-08-04 ENCOUNTER — OFFICE VISIT (OUTPATIENT)
Dept: PHYSICAL THERAPY | Facility: CLINIC | Age: 50
End: 2025-08-04
Attending: ORTHOPAEDIC SURGERY
Payer: COMMERCIAL

## 2025-08-04 DIAGNOSIS — M25.511 CHRONIC RIGHT SHOULDER PAIN: Primary | ICD-10-CM

## 2025-08-04 DIAGNOSIS — M75.41 SUBACROMIAL IMPINGEMENT OF RIGHT SHOULDER: ICD-10-CM

## 2025-08-04 DIAGNOSIS — G89.29 CHRONIC RIGHT SHOULDER PAIN: Primary | ICD-10-CM

## 2025-08-04 PROCEDURE — 97110 THERAPEUTIC EXERCISES: CPT

## 2025-08-04 PROCEDURE — 97112 NEUROMUSCULAR REEDUCATION: CPT

## 2025-08-18 ENCOUNTER — EVALUATION (OUTPATIENT)
Dept: PHYSICAL THERAPY | Facility: CLINIC | Age: 50
End: 2025-08-18
Attending: ORTHOPAEDIC SURGERY
Payer: COMMERCIAL

## 2025-08-18 DIAGNOSIS — G89.29 CHRONIC RIGHT SHOULDER PAIN: Primary | ICD-10-CM

## 2025-08-18 DIAGNOSIS — M25.511 CHRONIC RIGHT SHOULDER PAIN: Primary | ICD-10-CM

## 2025-08-18 DIAGNOSIS — M75.41 SUBACROMIAL IMPINGEMENT OF RIGHT SHOULDER: ICD-10-CM

## 2025-08-18 PROCEDURE — 97110 THERAPEUTIC EXERCISES: CPT

## 2025-08-18 PROCEDURE — 97112 NEUROMUSCULAR REEDUCATION: CPT

## (undated) DEVICE — NEEDLE 25G X 1 1/2

## (undated) DEVICE — UNIVERSAL  MINOR EXTREMITY PK: Brand: CARDINAL HEALTH

## (undated) DEVICE — DRAPE C-ARM X-RAY

## (undated) DEVICE — 10FR FRAZIER SUCTION HANDLE: Brand: CARDINAL HEALTH

## (undated) DEVICE — INTENDED FOR TISSUE SEPARATION, AND OTHER PROCEDURES THAT REQUIRE A SHARP SURGICAL BLADE TO PUNCTURE OR CUT.: Brand: BARD-PARKER SAFETY BLADES SIZE 15, STERILE

## (undated) DEVICE — TUBING SUCTION 5MM X 12 FT

## (undated) DEVICE — REM POLYHESIVE ADULT PATIENT RETURN ELECTRODE: Brand: VALLEYLAB

## (undated) DEVICE — CHLORAPREP HI-LITE 26ML ORANGE

## (undated) DEVICE — STRETCH BANDAGE: Brand: CURITY

## (undated) DEVICE — COBAN 4 IN STERILE

## (undated) DEVICE — 2000CC GUARDIAN II: Brand: GUARDIAN

## (undated) DEVICE — NEEDLE 18 G X 1 1/2

## (undated) DEVICE — SUT ETHILON 4-0 PS-2 18 IN 1667H

## (undated) DEVICE — GLOVE INDICATOR PI UNDERGLOVE SZ 7.5 BLUE

## (undated) DEVICE — SUT VICRYL 3-0 SH 27 IN J416H

## (undated) DEVICE — SYRINGE 10ML LL

## (undated) DEVICE — CAST PADDING 4 IN SYNTHETIC NON-STRL

## (undated) DEVICE — GAUZE SPONGES,16 PLY: Brand: CURITY

## (undated) DEVICE — PAD CAST 4 IN COTTON NON STERILE

## (undated) DEVICE — OCCLUSIVE GAUZE STRIP,3% BISMUTH TRIBROMOPHENATE IN PETROLATUM BLEND: Brand: XEROFORM

## (undated) DEVICE — GLOVE SRG BIOGEL ORTHOPEDIC 7.5

## (undated) DEVICE — SCD SEQUENTIAL COMPRESSION COMFORT SLEEVE MEDIUM KNEE LENGTH: Brand: KENDALL SCD